# Patient Record
Sex: FEMALE | Race: BLACK OR AFRICAN AMERICAN | Employment: FULL TIME | URBAN - METROPOLITAN AREA
[De-identification: names, ages, dates, MRNs, and addresses within clinical notes are randomized per-mention and may not be internally consistent; named-entity substitution may affect disease eponyms.]

---

## 2017-03-23 ENCOUNTER — HOSPITAL ENCOUNTER (EMERGENCY)
Facility: HOSPITAL | Age: 52
Discharge: HOME | End: 2017-03-23
Attending: EMERGENCY MEDICINE
Payer: COMMERCIAL

## 2017-03-23 VITALS
HEART RATE: 90 BPM | WEIGHT: 216.06 LBS | SYSTOLIC BLOOD PRESSURE: 157 MMHG | OXYGEN SATURATION: 100 % | HEIGHT: 66 IN | BODY MASS INDEX: 34.72 KG/M2 | RESPIRATION RATE: 16 BRPM | DIASTOLIC BLOOD PRESSURE: 87 MMHG | TEMPERATURE: 98 F

## 2017-03-23 DIAGNOSIS — M79.602 LEFT ARM PAIN: Primary | ICD-10-CM

## 2017-03-23 LAB — TROPONIN: <0.046 NG/ML (ref ?–0.05)

## 2017-03-23 PROCEDURE — 36415 COLL VENOUS BLD VENIPUNCTURE: CPT

## 2017-03-23 PROCEDURE — 99284 EMERGENCY DEPT VISIT MOD MDM: CPT

## 2017-03-23 PROCEDURE — 93010 ELECTROCARDIOGRAM REPORT: CPT

## 2017-03-23 PROCEDURE — 93005 ELECTROCARDIOGRAM TRACING: CPT

## 2017-03-23 PROCEDURE — 84484 ASSAY OF TROPONIN QUANT: CPT | Performed by: EMERGENCY MEDICINE

## 2017-03-23 RX ORDER — PREDNISONE 20 MG/1
40 TABLET ORAL DAILY
Qty: 10 TABLET | Refills: 0 | Status: SHIPPED | OUTPATIENT
Start: 2017-03-23 | End: 2017-03-28

## 2017-03-24 LAB
ATRIAL RATE: 104 BPM
P AXIS: 60 DEGREES
P-R INTERVAL: 188 MS
Q-T INTERVAL: 362 MS
QRS DURATION: 88 MS
QTC CALCULATION (BEZET): 476 MS
R AXIS: -2 DEGREES
T AXIS: 40 DEGREES
VENTRICULAR RATE: 104 BPM

## 2017-03-24 NOTE — ED PROVIDER NOTES
Patient Seen in: [facility] Emergency Department    History   Patient presents with:  Chest Pain Angina (cardiovascular)    Stated Complaint: L ARM PAIN SENT FROM IMMEDIATE CARE    HPI    Patient is a 50-year-old female comes in emergency room for e needed. ibuprofen 600 MG Oral Tab,  Take 600 mg by mouth every 6 (six) hours as needed for Pain.    Accu-Chek Multiclix Lancets Does not apply Misc,  SMBG BID-TID   Glucose Blood (ACCU-CHEK MARTIN) In Vitro Strip,  SMBG BID-TID   Diclofenac Sodium (VOLTARE reviewed and negative except as noted above. PSFH elements reviewed from today and agreed except as otherwise stated in HPI.     Physical Exam       ED Triage Vitals   BP 03/23/17 2125 157/87 mmHg   Pulse 03/23/17 2125 90   Resp 03/23/17 2125 16   Temp 0 Report. Rate: 104  Rhythm: Sinus Rhythm  Reading: Sinus tachycardia. Poor R-wave progression which is similar to prior EKG.   No acute changes            MDM     53-year-old female comes in emergency room for evaluate for left arm pain there is no clinica

## 2017-05-15 PROBLEM — M54.2 NECK PAIN: Status: ACTIVE | Noted: 2017-05-15

## 2017-05-15 PROBLEM — M54.12 CERVICAL RADICULITIS: Status: ACTIVE | Noted: 2017-05-15

## 2018-03-01 PROCEDURE — 87480 CANDIDA DNA DIR PROBE: CPT | Performed by: FAMILY MEDICINE

## 2018-03-01 PROCEDURE — 82043 UR ALBUMIN QUANTITATIVE: CPT | Performed by: FAMILY MEDICINE

## 2018-03-01 PROCEDURE — 82570 ASSAY OF URINE CREATININE: CPT | Performed by: FAMILY MEDICINE

## 2018-03-01 PROCEDURE — 87660 TRICHOMONAS VAGIN DIR PROBE: CPT | Performed by: FAMILY MEDICINE

## 2018-03-01 PROCEDURE — 87510 GARDNER VAG DNA DIR PROBE: CPT | Performed by: FAMILY MEDICINE

## 2018-03-16 PROBLEM — M76.61 ACHILLES TENDINITIS, RIGHT LEG: Status: ACTIVE | Noted: 2018-03-16

## 2020-07-27 ENCOUNTER — APPOINTMENT (OUTPATIENT)
Dept: CT IMAGING | Facility: HOSPITAL | Age: 55
End: 2020-07-27
Attending: EMERGENCY MEDICINE
Payer: COMMERCIAL

## 2020-07-27 ENCOUNTER — HOSPITAL ENCOUNTER (EMERGENCY)
Facility: HOSPITAL | Age: 55
Discharge: HOME | End: 2020-07-27
Attending: EMERGENCY MEDICINE
Payer: COMMERCIAL

## 2020-07-27 VITALS
SYSTOLIC BLOOD PRESSURE: 155 MMHG | RESPIRATION RATE: 26 BRPM | TEMPERATURE: 98 F | WEIGHT: 240 LBS | HEIGHT: 66 IN | OXYGEN SATURATION: 97 % | BODY MASS INDEX: 38.57 KG/M2 | HEART RATE: 78 BPM | DIASTOLIC BLOOD PRESSURE: 71 MMHG

## 2020-07-27 DIAGNOSIS — I10 HYPERTENSION, UNSPECIFIED TYPE: Primary | ICD-10-CM

## 2020-07-27 LAB
ALBUMIN SERPL-MCNC: 3.6 G/DL (ref 3.4–5)
ALBUMIN/GLOB SERPL: 0.8 {RATIO} (ref 1–2)
ALP LIVER SERPL-CCNC: 91 U/L (ref 41–108)
ALT SERPL-CCNC: 28 U/L (ref 13–56)
ANION GAP SERPL CALC-SCNC: 3 MMOL/L (ref 0–18)
AST SERPL-CCNC: 22 U/L (ref 15–37)
ATRIAL RATE: 89 BPM
BASOPHILS # BLD AUTO: 0.03 X10(3) UL (ref 0–0.2)
BASOPHILS NFR BLD AUTO: 0.3 %
BILIRUB SERPL-MCNC: 0.4 MG/DL (ref 0.1–2)
BUN BLD-MCNC: 8 MG/DL (ref 7–18)
BUN/CREAT SERPL: 11.6 (ref 10–20)
CALCIUM BLD-MCNC: 9.3 MG/DL (ref 8.5–10.1)
CHLORIDE SERPL-SCNC: 105 MMOL/L (ref 98–112)
CO2 SERPL-SCNC: 29 MMOL/L (ref 21–32)
CREAT BLD-MCNC: 0.69 MG/DL (ref 0.55–1.02)
DEPRECATED RDW RBC AUTO: 42.5 FL (ref 35.1–46.3)
EOSINOPHIL # BLD AUTO: 0.1 X10(3) UL (ref 0–0.7)
EOSINOPHIL NFR BLD AUTO: 1.1 %
ERYTHROCYTE [DISTWIDTH] IN BLOOD BY AUTOMATED COUNT: 14.9 % (ref 11–15)
GLOBULIN PLAS-MCNC: 4.7 G/DL (ref 2.8–4.4)
GLUCOSE BLD-MCNC: 113 MG/DL (ref 70–99)
HCT VFR BLD AUTO: 39.6 % (ref 35–48)
HGB BLD-MCNC: 12.6 G/DL (ref 12–16)
IMM GRANULOCYTES # BLD AUTO: 0.04 X10(3) UL (ref 0–1)
IMM GRANULOCYTES NFR BLD: 0.4 %
LYMPHOCYTES # BLD AUTO: 2.52 X10(3) UL (ref 1–4)
LYMPHOCYTES NFR BLD AUTO: 27.7 %
M PROTEIN MFR SERPL ELPH: 8.3 G/DL (ref 6.4–8.2)
MCH RBC QN AUTO: 25.3 PG (ref 26–34)
MCHC RBC AUTO-ENTMCNC: 31.8 G/DL (ref 31–37)
MCV RBC AUTO: 79.5 FL (ref 80–100)
MONOCYTES # BLD AUTO: 0.76 X10(3) UL (ref 0.1–1)
MONOCYTES NFR BLD AUTO: 8.3 %
NEUTROPHILS # BLD AUTO: 5.66 X10 (3) UL (ref 1.5–7.7)
NEUTROPHILS # BLD AUTO: 5.66 X10(3) UL (ref 1.5–7.7)
NEUTROPHILS NFR BLD AUTO: 62.2 %
OSMOLALITY SERPL CALC.SUM OF ELEC: 283 MOSM/KG (ref 275–295)
P AXIS: 52 DEGREES
P-R INTERVAL: 188 MS
PLATELET # BLD AUTO: 268 10(3)UL (ref 150–450)
POTASSIUM SERPL-SCNC: 3.9 MMOL/L (ref 3.5–5.1)
Q-T INTERVAL: 396 MS
QRS DURATION: 82 MS
QTC CALCULATION (BEZET): 481 MS
R AXIS: 19 DEGREES
RBC # BLD AUTO: 4.98 X10(6)UL (ref 3.8–5.3)
SODIUM SERPL-SCNC: 137 MMOL/L (ref 136–145)
T AXIS: 60 DEGREES
VENTRICULAR RATE: 89 BPM
WBC # BLD AUTO: 9.1 X10(3) UL (ref 4–11)

## 2020-07-27 PROCEDURE — 70450 CT HEAD/BRAIN W/O DYE: CPT | Performed by: EMERGENCY MEDICINE

## 2020-07-27 PROCEDURE — 93005 ELECTROCARDIOGRAM TRACING: CPT

## 2020-07-27 PROCEDURE — 85025 COMPLETE CBC W/AUTO DIFF WBC: CPT | Performed by: EMERGENCY MEDICINE

## 2020-07-27 PROCEDURE — 36415 COLL VENOUS BLD VENIPUNCTURE: CPT

## 2020-07-27 PROCEDURE — 99285 EMERGENCY DEPT VISIT HI MDM: CPT

## 2020-07-27 PROCEDURE — 80053 COMPREHEN METABOLIC PANEL: CPT | Performed by: EMERGENCY MEDICINE

## 2020-07-27 PROCEDURE — 93010 ELECTROCARDIOGRAM REPORT: CPT

## 2020-07-27 RX ORDER — CLONIDINE HYDROCHLORIDE 0.2 MG/1
0.2 TABLET ORAL DAILY
Qty: 30 TABLET | Refills: 0 | Status: SHIPPED | OUTPATIENT
Start: 2020-07-27 | End: 2020-07-29 | Stop reason: DRUGHIGH

## 2020-07-27 RX ORDER — CLONIDINE HYDROCHLORIDE 0.1 MG/1
0.1 TABLET ORAL ONCE
Status: COMPLETED | OUTPATIENT
Start: 2020-07-27 | End: 2020-07-27

## 2020-07-27 NOTE — ED PROVIDER NOTES
Patient Seen in: [facility] Emergency Department      History   Patient presents with:  Hypertension  Headache    Stated Complaint: /137, +Headache    HPI    Patient is a pleasant 11-year-old female with a history of hypertension diabetes who complaint: /137, +Headache  Other systems are as noted in HPI. Constitutional and vital signs reviewed. All other systems reviewed and negative except as noted above.     Physical Exam     ED Triage Vitals [07/27/20 1554]   BP (!) 205/98   Puls Abnormality         Status                     ---------                               -----------         ------                     CBC W/ DIFFERENTIAL[[id number]]          Abnormal            Final result                 Ple systolic, better this morning but then again at the immediate care they are about there. On arrival was elevated although has entered the room and is 171/85.   She is comfortable with a normal neuro exam.  Labs, CT ordered and will observe her blood pressu

## 2020-07-27 NOTE — ED INITIAL ASSESSMENT (HPI)
Pt c/o HTN and +Headache; EMS rpt en route PT blood pressure 228/137.  PT was seen at [facility]

## 2020-08-18 ENCOUNTER — HOSPITAL ENCOUNTER (OUTPATIENT)
Facility: HOSPITAL | Age: 55
Setting detail: OBSERVATION
Discharge: HOME | End: 2020-08-19
Attending: EMERGENCY MEDICINE | Admitting: INTERNAL MEDICINE
Payer: COMMERCIAL

## 2020-08-18 DIAGNOSIS — I16.0 HYPERTENSIVE URGENCY: Primary | ICD-10-CM

## 2020-08-18 DIAGNOSIS — E11.65 TYPE 2 DIABETES MELLITUS WITH HYPERGLYCEMIA, WITHOUT LONG-TERM CURRENT USE OF INSULIN (HCC): ICD-10-CM

## 2020-08-18 LAB
ALBUMIN SERPL-MCNC: 4 G/DL (ref 3.4–5)
ALBUMIN/GLOB SERPL: 0.8 {RATIO} (ref 1–2)
ALP LIVER SERPL-CCNC: 101 U/L (ref 41–108)
ALT SERPL-CCNC: 25 U/L (ref 13–56)
ANION GAP SERPL CALC-SCNC: 5 MMOL/L (ref 0–18)
ANION GAP SERPL CALC-SCNC: 5 MMOL/L (ref 0–18)
AST SERPL-CCNC: 15 U/L (ref 15–37)
ATRIAL RATE: 111 BPM
BASOPHILS # BLD AUTO: 0.03 X10(3) UL (ref 0–0.2)
BASOPHILS # BLD AUTO: 0.04 X10(3) UL (ref 0–0.2)
BASOPHILS NFR BLD AUTO: 0.3 %
BASOPHILS NFR BLD AUTO: 0.3 %
BILIRUB SERPL-MCNC: 0.2 MG/DL (ref 0.1–2)
BILIRUB UR QL STRIP.AUTO: NEGATIVE
BUN BLD-MCNC: 11 MG/DL (ref 7–18)
BUN BLD-MCNC: 7 MG/DL (ref 7–18)
BUN/CREAT SERPL: 14.7 (ref 10–20)
BUN/CREAT SERPL: 8.8 (ref 10–20)
CALCIUM BLD-MCNC: 8.9 MG/DL (ref 8.5–10.1)
CALCIUM BLD-MCNC: 9.7 MG/DL (ref 8.5–10.1)
CHLORIDE SERPL-SCNC: 102 MMOL/L (ref 98–112)
CHLORIDE SERPL-SCNC: 105 MMOL/L (ref 98–112)
CLARITY UR REFRACT.AUTO: CLEAR
CO2 SERPL-SCNC: 28 MMOL/L (ref 21–32)
CO2 SERPL-SCNC: 30 MMOL/L (ref 21–32)
CREAT BLD-MCNC: 0.75 MG/DL (ref 0.55–1.02)
CREAT BLD-MCNC: 0.8 MG/DL (ref 0.55–1.02)
DEPRECATED RDW RBC AUTO: 43.1 FL (ref 35.1–46.3)
DEPRECATED RDW RBC AUTO: 44.7 FL (ref 35.1–46.3)
EOSINOPHIL # BLD AUTO: 0.06 X10(3) UL (ref 0–0.7)
EOSINOPHIL # BLD AUTO: 0.07 X10(3) UL (ref 0–0.7)
EOSINOPHIL NFR BLD AUTO: 0.6 %
EOSINOPHIL NFR BLD AUTO: 0.6 %
ERYTHROCYTE [DISTWIDTH] IN BLOOD BY AUTOMATED COUNT: 14.6 % (ref 11–15)
ERYTHROCYTE [DISTWIDTH] IN BLOOD BY AUTOMATED COUNT: 14.7 % (ref 11–15)
GLOBULIN PLAS-MCNC: 5.3 G/DL (ref 2.8–4.4)
GLUCOSE BLD-MCNC: 105 MG/DL (ref 70–99)
GLUCOSE BLD-MCNC: 106 MG/DL (ref 70–99)
GLUCOSE BLD-MCNC: 107 MG/DL (ref 70–99)
GLUCOSE BLD-MCNC: 113 MG/DL (ref 70–99)
GLUCOSE BLD-MCNC: 133 MG/DL (ref 70–99)
GLUCOSE BLD-MCNC: 145 MG/DL (ref 70–99)
GLUCOSE UR STRIP.AUTO-MCNC: NEGATIVE MG/DL
HAV IGM SER QL: 2 MG/DL (ref 1.6–2.6)
HCT VFR BLD AUTO: 37.5 % (ref 35–48)
HCT VFR BLD AUTO: 43.8 % (ref 35–48)
HGB BLD-MCNC: 11.5 G/DL (ref 12–16)
HGB BLD-MCNC: 14.1 G/DL (ref 12–16)
IMM GRANULOCYTES # BLD AUTO: 0.04 X10(3) UL (ref 0–1)
IMM GRANULOCYTES # BLD AUTO: 0.06 X10(3) UL (ref 0–1)
IMM GRANULOCYTES NFR BLD: 0.3 %
IMM GRANULOCYTES NFR BLD: 0.6 %
KETONES UR STRIP.AUTO-MCNC: NEGATIVE MG/DL
LEUKOCYTE ESTERASE UR QL STRIP.AUTO: NEGATIVE
LYMPHOCYTES # BLD AUTO: 3.01 X10(3) UL (ref 1–4)
LYMPHOCYTES # BLD AUTO: 3.77 X10(3) UL (ref 1–4)
LYMPHOCYTES NFR BLD AUTO: 28.1 %
LYMPHOCYTES NFR BLD AUTO: 32.9 %
M PROTEIN MFR SERPL ELPH: 9.3 G/DL (ref 6.4–8.2)
MCH RBC QN AUTO: 25.4 PG (ref 26–34)
MCH RBC QN AUTO: 26.1 PG (ref 26–34)
MCHC RBC AUTO-ENTMCNC: 30.7 G/DL (ref 31–37)
MCHC RBC AUTO-ENTMCNC: 32.2 G/DL (ref 31–37)
MCV RBC AUTO: 81 FL (ref 80–100)
MCV RBC AUTO: 83 FL (ref 80–100)
MONOCYTES # BLD AUTO: 0.67 X10(3) UL (ref 0.1–1)
MONOCYTES # BLD AUTO: 0.84 X10(3) UL (ref 0.1–1)
MONOCYTES NFR BLD AUTO: 6.3 %
MONOCYTES NFR BLD AUTO: 7.3 %
NEUTROPHILS # BLD AUTO: 6.69 X10 (3) UL (ref 1.5–7.7)
NEUTROPHILS # BLD AUTO: 6.69 X10(3) UL (ref 1.5–7.7)
NEUTROPHILS # BLD AUTO: 6.89 X10 (3) UL (ref 1.5–7.7)
NEUTROPHILS # BLD AUTO: 6.89 X10(3) UL (ref 1.5–7.7)
NEUTROPHILS NFR BLD AUTO: 58.6 %
NEUTROPHILS NFR BLD AUTO: 64.1 %
NITRITE UR QL STRIP.AUTO: NEGATIVE
OSMOLALITY SERPL CALC.SUM OF ELEC: 284 MOSM/KG (ref 275–295)
OSMOLALITY SERPL CALC.SUM OF ELEC: 286 MOSM/KG (ref 275–295)
P AXIS: 62 DEGREES
P-R INTERVAL: 188 MS
PH UR STRIP.AUTO: 8 [PH] (ref 4.5–8)
PLATELET # BLD AUTO: 314 10(3)UL (ref 150–450)
PLATELET # BLD AUTO: 380 10(3)UL (ref 150–450)
POTASSIUM SERPL-SCNC: 3.5 MMOL/L (ref 3.5–5.1)
POTASSIUM SERPL-SCNC: 3.6 MMOL/L (ref 3.5–5.1)
PROT UR STRIP.AUTO-MCNC: 30 MG/DL
Q-T INTERVAL: 336 MS
QRS DURATION: 82 MS
QTC CALCULATION (BEZET): 456 MS
R AXIS: 41 DEGREES
RBC # BLD AUTO: 4.52 X10(6)UL (ref 3.8–5.3)
RBC # BLD AUTO: 5.41 X10(6)UL (ref 3.8–5.3)
RBC UR QL AUTO: NEGATIVE
SARS-COV-2 RNA RESP QL NAA+PROBE: NOT DETECTED
SODIUM SERPL-SCNC: 137 MMOL/L (ref 136–145)
SODIUM SERPL-SCNC: 138 MMOL/L (ref 136–145)
SP GR UR STRIP.AUTO: 1 (ref 1–1.03)
T AXIS: 51 DEGREES
UROBILINOGEN UR STRIP.AUTO-MCNC: <2 MG/DL
VENTRICULAR RATE: 111 BPM
WBC # BLD AUTO: 10.7 X10(3) UL (ref 4–11)
WBC # BLD AUTO: 11.5 X10(3) UL (ref 4–11)

## 2020-08-18 PROCEDURE — 99285 EMERGENCY DEPT VISIT HI MDM: CPT

## 2020-08-18 PROCEDURE — 96376 TX/PRO/DX INJ SAME DRUG ADON: CPT

## 2020-08-18 PROCEDURE — 82962 GLUCOSE BLOOD TEST: CPT

## 2020-08-18 PROCEDURE — 85025 COMPLETE CBC W/AUTO DIFF WBC: CPT | Performed by: EMERGENCY MEDICINE

## 2020-08-18 PROCEDURE — 81001 URINALYSIS AUTO W/SCOPE: CPT | Performed by: EMERGENCY MEDICINE

## 2020-08-18 PROCEDURE — 83735 ASSAY OF MAGNESIUM: CPT | Performed by: INTERNAL MEDICINE

## 2020-08-18 PROCEDURE — 93005 ELECTROCARDIOGRAM TRACING: CPT

## 2020-08-18 PROCEDURE — 80053 COMPREHEN METABOLIC PANEL: CPT | Performed by: EMERGENCY MEDICINE

## 2020-08-18 PROCEDURE — 96374 THER/PROPH/DIAG INJ IV PUSH: CPT

## 2020-08-18 PROCEDURE — 93010 ELECTROCARDIOGRAM REPORT: CPT

## 2020-08-18 PROCEDURE — 96372 THER/PROPH/DIAG INJ SC/IM: CPT

## 2020-08-18 PROCEDURE — 85025 COMPLETE CBC W/AUTO DIFF WBC: CPT | Performed by: INTERNAL MEDICINE

## 2020-08-18 RX ORDER — VALSARTAN 320 MG/1
320 TABLET ORAL DAILY
Status: DISCONTINUED | OUTPATIENT
Start: 2020-08-18 | End: 2020-08-20

## 2020-08-18 RX ORDER — ONDANSETRON 2 MG/ML
4 INJECTION INTRAMUSCULAR; INTRAVENOUS EVERY 6 HOURS PRN
Status: DISCONTINUED | OUTPATIENT
Start: 2020-08-18 | End: 2020-08-20

## 2020-08-18 RX ORDER — IBUPROFEN 200 MG
200 TABLET ORAL EVERY 4 HOURS PRN
Status: DISCONTINUED | OUTPATIENT
Start: 2020-08-18 | End: 2020-08-20

## 2020-08-18 RX ORDER — LABETALOL HYDROCHLORIDE 5 MG/ML
10 INJECTION, SOLUTION INTRAVENOUS EVERY 10 MIN PRN
Status: DISCONTINUED | OUTPATIENT
Start: 2020-08-18 | End: 2020-08-20

## 2020-08-18 RX ORDER — DEXTROSE MONOHYDRATE 25 G/50ML
50 INJECTION, SOLUTION INTRAVENOUS
Status: DISCONTINUED | OUTPATIENT
Start: 2020-08-18 | End: 2020-08-20

## 2020-08-18 RX ORDER — ENOXAPARIN SODIUM 100 MG/ML
40 INJECTION SUBCUTANEOUS DAILY
Status: DISCONTINUED | OUTPATIENT
Start: 2020-08-18 | End: 2020-08-20

## 2020-08-18 RX ORDER — POTASSIUM CHLORIDE 20 MEQ/1
40 TABLET, EXTENDED RELEASE ORAL EVERY 4 HOURS
Status: COMPLETED | OUTPATIENT
Start: 2020-08-18 | End: 2020-08-18

## 2020-08-18 RX ORDER — LABETALOL 100 MG/1
300 TABLET, FILM COATED ORAL EVERY 12 HOURS SCHEDULED
Status: DISCONTINUED | OUTPATIENT
Start: 2020-08-18 | End: 2020-08-20

## 2020-08-18 RX ORDER — HYDRALAZINE HYDROCHLORIDE 20 MG/ML
10 INJECTION INTRAMUSCULAR; INTRAVENOUS EVERY 6 HOURS PRN
Status: DISCONTINUED | OUTPATIENT
Start: 2020-08-18 | End: 2020-08-20

## 2020-08-18 RX ORDER — POLYETHYLENE GLYCOL 3350 17 G/17G
17 POWDER, FOR SOLUTION ORAL DAILY PRN
Status: DISCONTINUED | OUTPATIENT
Start: 2020-08-18 | End: 2020-08-20

## 2020-08-18 RX ORDER — IBUPROFEN 600 MG/1
600 TABLET ORAL EVERY 4 HOURS PRN
Status: DISCONTINUED | OUTPATIENT
Start: 2020-08-18 | End: 2020-08-20

## 2020-08-18 RX ORDER — CHLORTHALIDONE 25 MG/1
25 TABLET ORAL DAILY
Status: DISCONTINUED | OUTPATIENT
Start: 2020-08-18 | End: 2020-08-20

## 2020-08-18 RX ORDER — METOCLOPRAMIDE HYDROCHLORIDE 5 MG/ML
10 INJECTION INTRAMUSCULAR; INTRAVENOUS EVERY 8 HOURS PRN
Status: DISCONTINUED | OUTPATIENT
Start: 2020-08-18 | End: 2020-08-20

## 2020-08-18 RX ORDER — ACETAMINOPHEN 325 MG/1
650 TABLET ORAL EVERY 6 HOURS PRN
Status: DISCONTINUED | OUTPATIENT
Start: 2020-08-18 | End: 2020-08-20

## 2020-08-18 RX ORDER — HYDRALAZINE HYDROCHLORIDE 50 MG/1
50 TABLET, FILM COATED ORAL 3 TIMES DAILY PRN
Status: DISCONTINUED | OUTPATIENT
Start: 2020-08-18 | End: 2020-08-18

## 2020-08-18 RX ORDER — DOCUSATE SODIUM 100 MG/1
100 CAPSULE, LIQUID FILLED ORAL 2 TIMES DAILY
Status: DISCONTINUED | OUTPATIENT
Start: 2020-08-18 | End: 2020-08-20

## 2020-08-18 RX ORDER — ONDANSETRON 2 MG/ML
4 INJECTION INTRAMUSCULAR; INTRAVENOUS EVERY 4 HOURS PRN
Status: DISCONTINUED | OUTPATIENT
Start: 2020-08-18 | End: 2020-08-18

## 2020-08-18 RX ORDER — SODIUM PHOSPHATE, DIBASIC AND SODIUM PHOSPHATE, MONOBASIC 7; 19 G/133ML; G/133ML
1 ENEMA RECTAL ONCE AS NEEDED
Status: DISCONTINUED | OUTPATIENT
Start: 2020-08-18 | End: 2020-08-20

## 2020-08-18 RX ORDER — BISACODYL 10 MG
10 SUPPOSITORY, RECTAL RECTAL
Status: DISCONTINUED | OUTPATIENT
Start: 2020-08-18 | End: 2020-08-20

## 2020-08-18 RX ORDER — IBUPROFEN 400 MG/1
400 TABLET ORAL EVERY 4 HOURS PRN
Status: DISCONTINUED | OUTPATIENT
Start: 2020-08-18 | End: 2020-08-20

## 2020-08-18 RX ORDER — CLONIDINE HYDROCHLORIDE 0.2 MG/1
0.2 TABLET ORAL 2 TIMES DAILY
Status: DISCONTINUED | OUTPATIENT
Start: 2020-08-18 | End: 2020-08-20

## 2020-08-18 RX ORDER — LABETALOL HYDROCHLORIDE 5 MG/ML
10 INJECTION, SOLUTION INTRAVENOUS ONCE
Status: COMPLETED | OUTPATIENT
Start: 2020-08-18 | End: 2020-08-18

## 2020-08-18 NOTE — ED PROVIDER NOTES
Patient Seen in: [facility] Emergency Department      History   Patient presents with:  Hypertension    Stated Complaint: HTN    HPI    This is a pleasant 58-year-old -American female with a history of hypertension, history diabetes, who pres pertinent past medical history. No pertinent past surgical history. No pertinent social history. Review of Systems    Positive for stated complaint: HTN  Other systems are as noted in HPI.   Constitutional and vital si normal limits   URINALYSIS WITH CULTURE REFLEX - Abnormal; Notable for the following components:    Protein Urine 30  (*)     Bacteria Urine Rare (*)     All other components within normal limits   CBC W/ DIFFERENTIAL - Abnormal; Notable for the following persistent hypertensive state which is recalcitrant to multiple hypertensive medications, patient will be admitted for observation, administration of IV antihypertensive medications and repeat assessment in the morning to consider adjustments to her medica

## 2020-08-18 NOTE — CONSULTS
[facility] Cardiology  Report of Consultation    Liseth Moore Patient Status:  Observation    1965 MRN [id number]   [facility] 3NE-A Attending Vincenzo Xiao, [address] [facility] Day # 0 PCP Yusef Vinson MD     [facility] for [facility] ([facility] Infusion:       PRN Medications:   ondansetron HCl, ondansetron HCl, Labetalol HCl, acetaminophen, ibuprofen **OR** ibuprofen **OR** ibuprofen, ondansetron HCl, Metoclopramide HCl, PEG 3350, magnesium hydroxide, bisacodyl, Fleet Enema, hydrALAzine HCl, glu Glucose Monitoring Suppl (ACCU-CHEK MARTIN) Does not apply Kit, SMBG BID-TID, Disp: 1 Kit, Rfl: 0  Blood Glucose Calibration (ACCU-CHEK MARTIN) In Vitro Solution, Test with each new set of strips, Disp: 1 Each, Rfl: 3        Allergies:   No Known Allergies BILT 0.2  --    TP 9.3*  --        Recent Labs   Lab 08/18/20  0102 08/18/20  0800   RBC 5.41* 4.52   HGB 14.1 11.5*   HCT 43.8 37.5   MCV 81.0 83.0   MCH 26.1 25.4*   MCHC 32.2 30.7*   RDW 14.6 14.7   NEPRELIM 6.89 6.69   WBC 10.7 11.5*   .0 314.

## 2020-08-18 NOTE — ED NOTES
Reuben King at bedside. 1-2 days: Independent in transfers bed to chair and vice versa, toilet transfers and maneuvers using rolling walker device, observing proper body  mechanics, positioning and precautions.

## 2020-08-18 NOTE — ED NOTES
Assumed care,report received from hospitals. Pt resting in bed, she denies CP, denies DENTON. Reports HA, 4/10. Pt updated of the POC.  Repeat Labetalol IV given as ordered

## 2020-08-18 NOTE — PLAN OF CARE
Assumed care at [phone]. Pt admitted for hypertension. A&O x4. On Ra, . Daily weight. NSR on tele. VSS. Denies pain. Voids. Electrolyte protocol. Up ad anjel. Refuses SCDs. Accuchecks QID. Carb controlled diet, 3-4 GM sodium diet. Will continue to monitor.

## 2020-08-18 NOTE — ED INITIAL ASSESSMENT (HPI)
Patient reports a headache all day and dizziness. She took her blood pressure pills tonight, and a at-home blood pressure reading that was elevated. A&Ox4, denies any N/V/D. Patient also feels as if she has to urinate every 5 minutes.

## 2020-08-18 NOTE — H&P
[facility] Hospitalist Team  History and Physical       Assessment/Plan:     #Resistant HTN  -has been on various medications without good control  -cont current regimen as currently controlled  -low sodium diet  -ordered sleep study on DC  -check renal US  -card Allergies     No current outpatient medications on file.       Social History    Tobacco Use      Smoking status: Never Smoker      Smokeless tobacco: Never Used    Alcohol use: No      Alcohol/week: 0.0 standard drinks       Fam Hx  Family History   Proble (-) weakness (-) numbness  PSYCHIATRIC:  (-) disorder of thought or mood  ENDOCRINE:  (-) heat or cold intolerance (-) polyuria (-) polydipsia  HEMATOLOGICAL:  (-) easy bruising (-) bleeding    OBJECTIVE:  /71 (BP Location: Left arm)   Pulse 68   Tem Noncontrast CT scanning is performed through the brain. Dose reduction techniques were used. Dose information is transmitted to the [facility] [employer] [facility] Radiology) NRDR ([address]) which includes the Dose Index Registry.   PATIENT

## 2020-08-19 ENCOUNTER — APPOINTMENT (OUTPATIENT)
Dept: CV DIAGNOSTICS | Facility: HOSPITAL | Age: 55
End: 2020-08-19
Attending: INTERNAL MEDICINE
Payer: COMMERCIAL

## 2020-08-19 ENCOUNTER — APPOINTMENT (OUTPATIENT)
Dept: CT IMAGING | Facility: HOSPITAL | Age: 55
End: 2020-08-19
Attending: INTERNAL MEDICINE
Payer: COMMERCIAL

## 2020-08-19 ENCOUNTER — APPOINTMENT (OUTPATIENT)
Dept: ULTRASOUND IMAGING | Facility: HOSPITAL | Age: 55
End: 2020-08-19
Attending: INTERNAL MEDICINE
Payer: COMMERCIAL

## 2020-08-19 VITALS
RESPIRATION RATE: 16 BRPM | WEIGHT: 239.69 LBS | HEART RATE: 77 BPM | HEIGHT: 66 IN | OXYGEN SATURATION: 100 % | BODY MASS INDEX: 38.52 KG/M2 | TEMPERATURE: 98 F | SYSTOLIC BLOOD PRESSURE: 129 MMHG | DIASTOLIC BLOOD PRESSURE: 64 MMHG

## 2020-08-19 LAB
ANION GAP SERPL CALC-SCNC: 2 MMOL/L (ref 0–18)
BUN BLD-MCNC: 12 MG/DL (ref 7–18)
BUN/CREAT SERPL: 16.2 (ref 10–20)
CALCIUM BLD-MCNC: 9.2 MG/DL (ref 8.5–10.1)
CHLORIDE SERPL-SCNC: 105 MMOL/L (ref 98–112)
CO2 SERPL-SCNC: 30 MMOL/L (ref 21–32)
CREAT BLD-MCNC: 0.74 MG/DL (ref 0.55–1.02)
GLUCOSE BLD-MCNC: 101 MG/DL (ref 70–99)
GLUCOSE BLD-MCNC: 117 MG/DL (ref 70–99)
GLUCOSE BLD-MCNC: 121 MG/DL (ref 70–99)
OSMOLALITY SERPL CALC.SUM OF ELEC: 285 MOSM/KG (ref 275–295)
POTASSIUM SERPL-SCNC: 4 MMOL/L (ref 3.5–5.1)
SODIUM SERPL-SCNC: 137 MMOL/L (ref 136–145)

## 2020-08-19 PROCEDURE — 82088 ASSAY OF ALDOSTERONE: CPT | Performed by: INTERNAL MEDICINE

## 2020-08-19 PROCEDURE — 93975 VASCULAR STUDY: CPT | Performed by: INTERNAL MEDICINE

## 2020-08-19 PROCEDURE — 93306 TTE W/DOPPLER COMPLETE: CPT | Performed by: INTERNAL MEDICINE

## 2020-08-19 PROCEDURE — 76775 US EXAM ABDO BACK WALL LIM: CPT | Performed by: INTERNAL MEDICINE

## 2020-08-19 PROCEDURE — B246ZZZ ULTRASONOGRAPHY OF RIGHT AND LEFT HEART: ICD-10-PCS | Performed by: INTERNAL MEDICINE

## 2020-08-19 PROCEDURE — 96372 THER/PROPH/DIAG INJ SC/IM: CPT

## 2020-08-19 PROCEDURE — 80048 BASIC METABOLIC PNL TOTAL CA: CPT | Performed by: INTERNAL MEDICINE

## 2020-08-19 PROCEDURE — 74175 CTA ABDOMEN W/CONTRAST: CPT | Performed by: INTERNAL MEDICINE

## 2020-08-19 PROCEDURE — 82962 GLUCOSE BLOOD TEST: CPT

## 2020-08-19 PROCEDURE — 84244 ASSAY OF RENIN: CPT | Performed by: INTERNAL MEDICINE

## 2020-08-19 PROCEDURE — 76770 US EXAM ABDO BACK WALL COMP: CPT | Performed by: INTERNAL MEDICINE

## 2020-08-19 RX ORDER — CLONIDINE HYDROCHLORIDE 0.2 MG/1
0.2 TABLET ORAL 2 TIMES DAILY
Qty: 60 TABLET | Refills: 3 | Status: SHIPPED | OUTPATIENT
Start: 2020-08-19 | End: 2021-01-13 | Stop reason: DRUGHIGH

## 2020-08-19 RX ORDER — CHLORTHALIDONE 25 MG/1
25 TABLET ORAL DAILY
Qty: 30 TABLET | Refills: 3 | Status: SHIPPED | OUTPATIENT
Start: 2020-08-20 | End: 2021-01-21

## 2020-08-19 RX ORDER — VALSARTAN 320 MG/1
320 TABLET ORAL DAILY
Qty: 90 TABLET | Refills: 3 | Status: SHIPPED | OUTPATIENT
Start: 2020-08-20 | End: 2021-11-30

## 2020-08-19 RX ORDER — LABETALOL 300 MG/1
300 TABLET, FILM COATED ORAL EVERY 12 HOURS SCHEDULED
Qty: 60 TABLET | Refills: 3 | Status: SHIPPED | OUTPATIENT
Start: 2020-08-19 | End: 2021-11-30

## 2020-08-19 NOTE — PROGRESS NOTES
Received pt at 2300. Pt A&Ox4. o2 sats WNL on room air. On tele, NSR. NPO will be getting an ECHO and U/S kidney and bladder on 8/19. Voids to the bathroom by self. No c/o pain for me at the moment. VSS.  WCTM

## 2020-08-19 NOTE — PLAN OF CARE
Assumed care at [address] [facility]. Pt is A&O x4. On RA, . Nsr on tele Systolic BP ranging b/q 958 - 150. Receives Lovenox. Refuses SCDs. Electrolyte protocol. Denies pain. Up ad anjel. Accucheck QID. Carb controlled diet, 3-4 GM sodium diet.    Card

## 2020-08-19 NOTE — PROGRESS NOTES
[facility] Hospitalist Team  Progress Note      Tre Chung  9/29/1965    Assessment/Plan:     #Resistant HTN  -has been on various medications without good control  -cont current regimen as currently controlled  -low sodium diet  -ordered sleep study on DC  - 0.2 mg Oral BID   • valsartan  320 mg Oral Daily   • Labetalol HCl  300 mg Oral 2 times per day   • enoxaparin  40 mg Subcutaneous Daily   • docusate sodium  100 mg Oral BID   • Insulin Aspart Pen  4-20 Units Subcutaneous TID CC and HS   • chlorthalidone

## 2020-08-19 NOTE — PLAN OF CARE
Renal artery CT scan without evidence of renal artery stenosis. BP improved. Stable CV status for discharge home. Follow up 1 month. Medications reconciled.      EZEQUIEL Willis

## 2020-08-19 NOTE — PROGRESS NOTES
Called ultrasound to clarify if pt could have sips with meds while NPO per ultrasound ok to give med with small sip of water.

## 2020-08-19 NOTE — PROGRESS NOTES
[facility] Cardiology  Progress Note    Josué Goncalves Patient Status:  Observation    1965 MRN [id number]   [facility] 3NE-A Attending Aidee Urrutia, DO   Hosp Day # 0 PCP Stefanie Krabbe, MD     Subjective:   No chest pain. is no jugular venous distention. Cardiovascular:  Cardiovascular examination demonstrates a regular rate and rhythm. There is normal S1, S2. There is no S3 or S4. There are no murmurs, rubs, or gallops. No click is appreciated.   PMI is nondisplaced w MD  8/19/2020  9:19 AM

## 2020-08-19 NOTE — PROGRESS NOTES
Patient alertx4, ra, tele, up ad anjel. No c/o pain or discomfort. BP management per MAR, echo, US kidney/bladder complete. Waiting on CT angiography of the renal artery. Possible d/c home pending procedure results and BP stabilization.   Will continue to

## 2020-08-22 LAB
ALDOSTERONE/RENIN ACTIVITY RATIO: 15.1 RATIO
ALDOSTERONE: 9.1 NG/DL
RENIN ACTIVITY: 0.6 NG/ML/HR

## 2020-08-26 ENCOUNTER — HOSPITAL ENCOUNTER (EMERGENCY)
Facility: HOSPITAL | Age: 55
Discharge: HOME | End: 2020-08-26
Attending: EMERGENCY MEDICINE
Payer: COMMERCIAL

## 2020-08-26 VITALS
OXYGEN SATURATION: 98 % | BODY MASS INDEX: 38 KG/M2 | DIASTOLIC BLOOD PRESSURE: 82 MMHG | TEMPERATURE: 98 F | WEIGHT: 235 LBS | SYSTOLIC BLOOD PRESSURE: 142 MMHG | RESPIRATION RATE: 16 BRPM | HEART RATE: 86 BPM

## 2020-08-26 DIAGNOSIS — I10 ESSENTIAL HYPERTENSION: Primary | ICD-10-CM

## 2020-08-26 PROCEDURE — 99283 EMERGENCY DEPT VISIT LOW MDM: CPT

## 2020-08-26 RX ORDER — IBUPROFEN 600 MG/1
600 TABLET ORAL ONCE
Status: COMPLETED | OUTPATIENT
Start: 2020-08-26 | End: 2020-08-26

## 2020-08-26 RX ORDER — HYDRALAZINE HYDROCHLORIDE 25 MG/1
25 TABLET, FILM COATED ORAL EVERY 8 HOURS SCHEDULED
Status: DISCONTINUED | OUTPATIENT
Start: 2020-08-26 | End: 2020-08-26

## 2020-08-26 RX ORDER — HYDRALAZINE HYDROCHLORIDE 25 MG/1
25 TABLET, FILM COATED ORAL 3 TIMES DAILY
Qty: 30 TABLET | Refills: 0 | Status: SHIPPED | OUTPATIENT
Start: 2020-08-26 | End: 2020-09-16

## 2020-08-26 NOTE — ED PROVIDER NOTES
Patient Seen in: [facility] Emergency Department      History   Patient presents with:  Numbness Weakness    Stated Complaint: tingling to left arm sent from     HPI    50-year-old female presents emergency department who is not feeling right afte exam.  Stethoscope and any equipment used during my examination was cleaned with super sani-cloth germicidal wipes following the exam.         Vital signs reviewed  General appearance: Patient is alert and in no acute distress  HEENT: Pupils equal react to prescribed. Patient is aware that they are to return to ED if any worsening problems. Patient was also instructed to followup with the appropriate physician. Patient verbalizes and agrees with plan. Patient discharged in good condition.

## 2020-08-26 NOTE — ED INITIAL ASSESSMENT (HPI)
Per patient, not feeling right and having HTN. Was admitted last week for HTN. AAox 4. Patient feels a \"fullness\" in her head and took tylenol for this about 1.5 hours ago.

## 2020-09-15 PROBLEM — E05.00 GRAVES DISEASE: Status: ACTIVE | Noted: 2020-09-15

## 2021-05-26 PROBLEM — U07.1 COVID-19 VIRUS INFECTION: Status: ACTIVE | Noted: 2021-05-22

## 2023-09-27 ENCOUNTER — APPOINTMENT (OUTPATIENT)
Dept: CT IMAGING | Age: 58
End: 2023-09-27
Attending: EMERGENCY MEDICINE
Payer: COMMERCIAL

## 2023-09-27 ENCOUNTER — HOSPITAL ENCOUNTER (EMERGENCY)
Age: 58
Discharge: HOME | End: 2023-09-27
Attending: EMERGENCY MEDICINE
Payer: COMMERCIAL

## 2023-09-27 VITALS
WEIGHT: 240 LBS | BODY MASS INDEX: 38.57 KG/M2 | DIASTOLIC BLOOD PRESSURE: 76 MMHG | TEMPERATURE: 98 F | SYSTOLIC BLOOD PRESSURE: 153 MMHG | RESPIRATION RATE: 17 BRPM | HEART RATE: 89 BPM | OXYGEN SATURATION: 99 % | HEIGHT: 66 IN

## 2023-09-27 DIAGNOSIS — N13.2 HYDRONEPHROSIS WITH URINARY OBSTRUCTION DUE TO URETERAL CALCULUS: ICD-10-CM

## 2023-09-27 DIAGNOSIS — N20.1 RIGHT URETERAL STONE: Primary | ICD-10-CM

## 2023-09-27 DIAGNOSIS — R09.89 LABILE HYPERTENSION: ICD-10-CM

## 2023-09-27 LAB
ALBUMIN SERPL-MCNC: 3.7 G/DL (ref 3.4–5)
ALBUMIN/GLOB SERPL: 0.7 {RATIO} (ref 1–2)
ALP LIVER SERPL-CCNC: 91 U/L
ALT SERPL-CCNC: 31 U/L
ANION GAP SERPL CALC-SCNC: 7 MMOL/L (ref 0–18)
AST SERPL-CCNC: 18 U/L (ref 15–37)
ATRIAL RATE: 131 BPM
BASOPHILS # BLD AUTO: 0.06 X10(3) UL (ref 0–0.2)
BASOPHILS NFR BLD AUTO: 0.4 %
BILIRUB SERPL-MCNC: 0.3 MG/DL (ref 0.1–2)
BILIRUB UR QL STRIP.AUTO: NEGATIVE
BUN BLD-MCNC: 10 MG/DL (ref 7–18)
CALCIUM BLD-MCNC: 9.9 MG/DL (ref 8.5–10.1)
CHLORIDE SERPL-SCNC: 100 MMOL/L (ref 98–112)
CLARITY UR REFRACT.AUTO: CLEAR
CO2 SERPL-SCNC: 31 MMOL/L (ref 21–32)
COLOR UR AUTO: YELLOW
CREAT BLD-MCNC: 0.84 MG/DL
D DIMER PPP FEU-MCNC: 1.03 UG/ML FEU (ref ?–0.58)
EGFRCR SERPLBLD CKD-EPI 2021: 80 ML/MIN/1.73M2 (ref 60–?)
EOSINOPHIL # BLD AUTO: 0.09 X10(3) UL (ref 0–0.7)
EOSINOPHIL NFR BLD AUTO: 0.6 %
ERYTHROCYTE [DISTWIDTH] IN BLOOD BY AUTOMATED COUNT: 15.4 %
GLOBULIN PLAS-MCNC: 5.2 G/DL (ref 2.8–4.4)
GLUCOSE BLD-MCNC: 164 MG/DL (ref 70–99)
GLUCOSE BLD-MCNC: 168 MG/DL (ref 70–99)
GLUCOSE UR STRIP.AUTO-MCNC: NEGATIVE MG/DL
HCT VFR BLD AUTO: 40.2 %
HGB BLD-MCNC: 12.9 G/DL
IMM GRANULOCYTES # BLD AUTO: 0.09 X10(3) UL (ref 0–1)
IMM GRANULOCYTES NFR BLD: 0.6 %
KETONES UR STRIP.AUTO-MCNC: NEGATIVE MG/DL
LEUKOCYTE ESTERASE UR QL STRIP.AUTO: NEGATIVE
LYMPHOCYTES # BLD AUTO: 3.64 X10(3) UL (ref 1–4)
LYMPHOCYTES NFR BLD AUTO: 26.1 %
MCH RBC QN AUTO: 25 PG (ref 26–34)
MCHC RBC AUTO-ENTMCNC: 32.1 G/DL (ref 31–37)
MCV RBC AUTO: 77.8 FL
MONOCYTES # BLD AUTO: 0.96 X10(3) UL (ref 0.1–1)
MONOCYTES NFR BLD AUTO: 6.9 %
NEUTROPHILS # BLD AUTO: 9.1 X10 (3) UL (ref 1.5–7.7)
NEUTROPHILS # BLD AUTO: 9.1 X10(3) UL (ref 1.5–7.7)
NEUTROPHILS NFR BLD AUTO: 65.4 %
NITRITE UR QL STRIP.AUTO: NEGATIVE
OSMOLALITY SERPL CALC.SUM OF ELEC: 289 MOSM/KG (ref 275–295)
P-R INTERVAL: 112 MS
PH UR STRIP.AUTO: 7 [PH] (ref 5–8)
PLATELET # BLD AUTO: 399 10(3)UL (ref 150–450)
POTASSIUM SERPL-SCNC: 3.3 MMOL/L (ref 3.5–5.1)
PROT SERPL-MCNC: 8.9 G/DL (ref 6.4–8.2)
Q-T INTERVAL: 384 MS
QRS DURATION: 86 MS
QTC CALCULATION (BEZET): 567 MS
R AXIS: 27 DEGREES
RBC # BLD AUTO: 5.17 X10(6)UL
RBC UR QL AUTO: NEGATIVE
SARS-COV-2 RNA RESP QL NAA+PROBE: NOT DETECTED
SODIUM SERPL-SCNC: 138 MMOL/L (ref 136–145)
SP GR UR STRIP.AUTO: 1.02 (ref 1–1.03)
T AXIS: 56 DEGREES
TROPONIN I HIGH SENSITIVITY: 20 NG/L
TSI SER-ACNC: 2.5 MIU/ML (ref 0.36–3.74)
UROBILINOGEN UR STRIP.AUTO-MCNC: 0.2 MG/DL
VENTRICULAR RATE: 131 BPM
WBC # BLD AUTO: 13.9 X10(3) UL (ref 4–11)

## 2023-09-27 PROCEDURE — 71275 CT ANGIOGRAPHY CHEST: CPT | Performed by: EMERGENCY MEDICINE

## 2023-09-27 PROCEDURE — 85025 COMPLETE CBC W/AUTO DIFF WBC: CPT | Performed by: EMERGENCY MEDICINE

## 2023-09-27 PROCEDURE — 93010 ELECTROCARDIOGRAM REPORT: CPT

## 2023-09-27 PROCEDURE — 74176 CT ABD & PELVIS W/O CONTRAST: CPT | Performed by: EMERGENCY MEDICINE

## 2023-09-27 PROCEDURE — 84484 ASSAY OF TROPONIN QUANT: CPT | Performed by: EMERGENCY MEDICINE

## 2023-09-27 PROCEDURE — 99285 EMERGENCY DEPT VISIT HI MDM: CPT

## 2023-09-27 PROCEDURE — 81015 MICROSCOPIC EXAM OF URINE: CPT | Performed by: EMERGENCY MEDICINE

## 2023-09-27 PROCEDURE — 96360 HYDRATION IV INFUSION INIT: CPT

## 2023-09-27 PROCEDURE — 82962 GLUCOSE BLOOD TEST: CPT

## 2023-09-27 PROCEDURE — 85379 FIBRIN DEGRADATION QUANT: CPT | Performed by: EMERGENCY MEDICINE

## 2023-09-27 PROCEDURE — 84443 ASSAY THYROID STIM HORMONE: CPT | Performed by: EMERGENCY MEDICINE

## 2023-09-27 PROCEDURE — 93005 ELECTROCARDIOGRAM TRACING: CPT

## 2023-09-27 PROCEDURE — 81001 URINALYSIS AUTO W/SCOPE: CPT | Performed by: EMERGENCY MEDICINE

## 2023-09-27 PROCEDURE — 80053 COMPREHEN METABOLIC PANEL: CPT | Performed by: EMERGENCY MEDICINE

## 2023-09-27 RX ORDER — HYDROCHLOROTHIAZIDE 25 MG/1
100 TABLET ORAL 2 TIMES DAILY
COMMUNITY

## 2023-09-27 RX ORDER — CLONIDINE HYDROCHLORIDE 0.1 MG/1
0.1 TABLET ORAL ONCE
Status: COMPLETED | OUTPATIENT
Start: 2023-09-27 | End: 2023-09-27

## 2023-09-27 RX ORDER — POTASSIUM CHLORIDE 20 MEQ/1
40 TABLET, EXTENDED RELEASE ORAL ONCE
Status: COMPLETED | OUTPATIENT
Start: 2023-09-27 | End: 2023-09-27

## 2023-09-27 NOTE — ED INITIAL ASSESSMENT (HPI)
Pt states she started having a stiff neck on Saturday still having a headache no injury.  Pt also feeling palpitations in her heart no chest pain

## 2023-09-28 NOTE — DISCHARGE INSTRUCTIONS
Please take a dose of your clonidine in response to high blood pressures, he needs make an appointment to follow-up with Dr. Anabell Michelle have a large kidney stone that has fallen into your ureter and is obstructing the right kidney and may be intermittently affecting your blood pressure    Ever, this could also be the occasional skipping of the clonidine, you cannot skip that medication, speak with your primary care doctor about it because we may need to talk about doing a patch of clonidine if you continue to need blood pressure.

## 2023-10-11 ENCOUNTER — APPOINTMENT (OUTPATIENT)
Dept: MRI IMAGING | Age: 58
End: 2023-10-11
Attending: EMERGENCY MEDICINE
Payer: COMMERCIAL

## 2023-10-11 ENCOUNTER — APPOINTMENT (OUTPATIENT)
Dept: GENERAL RADIOLOGY | Age: 58
End: 2023-10-11
Attending: EMERGENCY MEDICINE
Payer: COMMERCIAL

## 2023-10-11 ENCOUNTER — HOSPITAL ENCOUNTER (EMERGENCY)
Age: 58
Discharge: HOME | End: 2023-10-11
Attending: EMERGENCY MEDICINE
Payer: COMMERCIAL

## 2023-10-11 VITALS
SYSTOLIC BLOOD PRESSURE: 174 MMHG | TEMPERATURE: 98 F | DIASTOLIC BLOOD PRESSURE: 103 MMHG | WEIGHT: 240 LBS | HEART RATE: 92 BPM | HEIGHT: 66 IN | BODY MASS INDEX: 38.57 KG/M2 | OXYGEN SATURATION: 98 % | RESPIRATION RATE: 18 BRPM

## 2023-10-11 DIAGNOSIS — I10 HYPERTENSION, UNSPECIFIED TYPE: ICD-10-CM

## 2023-10-11 DIAGNOSIS — R51.9 ACUTE NONINTRACTABLE HEADACHE, UNSPECIFIED HEADACHE TYPE: Primary | ICD-10-CM

## 2023-10-11 LAB
ALBUMIN SERPL-MCNC: 3.8 G/DL (ref 3.4–5)
ALBUMIN/GLOB SERPL: 0.7 {RATIO} (ref 1–2)
ALP LIVER SERPL-CCNC: 91 U/L
ALT SERPL-CCNC: 49 U/L
ANION GAP SERPL CALC-SCNC: 9 MMOL/L (ref 0–18)
AST SERPL-CCNC: 27 U/L (ref 15–37)
BASOPHILS # BLD: 0 X10(3) UL (ref 0–0.2)
BASOPHILS NFR BLD: 0 %
BILIRUB SERPL-MCNC: 0.3 MG/DL (ref 0.1–2)
BUN BLD-MCNC: 12 MG/DL (ref 7–18)
CALCIUM BLD-MCNC: 9.3 MG/DL (ref 8.5–10.1)
CHLORIDE SERPL-SCNC: 102 MMOL/L (ref 98–112)
CLARITY UR REFRACT.AUTO: CLEAR
CO2 SERPL-SCNC: 26 MMOL/L (ref 21–32)
CREAT BLD-MCNC: 0.96 MG/DL
EGFRCR SERPLBLD CKD-EPI 2021: 68 ML/MIN/1.73M2 (ref 60–?)
EOSINOPHIL # BLD: 0.29 X10(3) UL (ref 0–0.7)
EOSINOPHIL NFR BLD: 2 %
ERYTHROCYTE [DISTWIDTH] IN BLOOD BY AUTOMATED COUNT: 15.5 %
GLOBULIN PLAS-MCNC: 5.1 G/DL (ref 2.8–4.4)
GLUCOSE BLD-MCNC: 205 MG/DL (ref 70–99)
HCT VFR BLD AUTO: 39.8 %
HGB BLD-MCNC: 13 G/DL
LYMPHOCYTES NFR BLD: 32 %
LYMPHOCYTES NFR BLD: 4.64 X10(3) UL (ref 1–4)
MCH RBC QN AUTO: 25 PG (ref 26–34)
MCHC RBC AUTO-ENTMCNC: 32.7 G/DL (ref 31–37)
MCV RBC AUTO: 76.5 FL
METAMYELOCYTES # BLD: 0.29 X10(3) UL
METAMYELOCYTES NFR BLD: 2 %
MONOCYTES # BLD: 0.87 X10(3) UL (ref 0.1–1)
MONOCYTES NFR BLD: 6 %
MORPHOLOGY: NORMAL
NEUTROPHILS # BLD AUTO: 7.99 X10 (3) UL (ref 1.5–7.7)
NEUTROPHILS NFR BLD: 56 %
NEUTS BAND NFR BLD: 2 %
NEUTS HYPERSEG # BLD: 8.41 X10(3) UL (ref 1.5–7.7)
OSMOLALITY SERPL CALC.SUM OF ELEC: 290 MOSM/KG (ref 275–295)
PLATELET # BLD AUTO: 370 10(3)UL (ref 150–450)
PLATELET MORPHOLOGY: NORMAL
POTASSIUM SERPL-SCNC: 3.2 MMOL/L (ref 3.5–5.1)
PROT SERPL-MCNC: 8.9 G/DL (ref 6.4–8.2)
RBC # BLD AUTO: 5.2 X10(6)UL
RBC #/AREA URNS AUTO: >10 /HPF
SODIUM SERPL-SCNC: 137 MMOL/L (ref 136–145)
TOTAL CELLS COUNTED BLD: 100
TROPONIN I HIGH SENSITIVITY: 18 NG/L
TROPONIN I HIGH SENSITIVITY: 20 NG/L
WBC # BLD AUTO: 14.5 X10(3) UL (ref 4–11)

## 2023-10-11 PROCEDURE — 70551 MRI BRAIN STEM W/O DYE: CPT | Performed by: EMERGENCY MEDICINE

## 2023-10-11 PROCEDURE — 85007 BL SMEAR W/DIFF WBC COUNT: CPT | Performed by: EMERGENCY MEDICINE

## 2023-10-11 PROCEDURE — 99285 EMERGENCY DEPT VISIT HI MDM: CPT

## 2023-10-11 PROCEDURE — 85027 COMPLETE CBC AUTOMATED: CPT | Performed by: EMERGENCY MEDICINE

## 2023-10-11 PROCEDURE — 81015 MICROSCOPIC EXAM OF URINE: CPT | Performed by: EMERGENCY MEDICINE

## 2023-10-11 PROCEDURE — 87086 URINE CULTURE/COLONY COUNT: CPT | Performed by: EMERGENCY MEDICINE

## 2023-10-11 PROCEDURE — 85025 COMPLETE CBC W/AUTO DIFF WBC: CPT | Performed by: EMERGENCY MEDICINE

## 2023-10-11 PROCEDURE — 81001 URINALYSIS AUTO W/SCOPE: CPT | Performed by: EMERGENCY MEDICINE

## 2023-10-11 PROCEDURE — 93010 ELECTROCARDIOGRAM REPORT: CPT

## 2023-10-11 PROCEDURE — 84484 ASSAY OF TROPONIN QUANT: CPT | Performed by: EMERGENCY MEDICINE

## 2023-10-11 PROCEDURE — 71045 X-RAY EXAM CHEST 1 VIEW: CPT | Performed by: EMERGENCY MEDICINE

## 2023-10-11 PROCEDURE — 80053 COMPREHEN METABOLIC PANEL: CPT | Performed by: EMERGENCY MEDICINE

## 2023-10-11 PROCEDURE — 36415 COLL VENOUS BLD VENIPUNCTURE: CPT

## 2023-10-11 PROCEDURE — 93005 ELECTROCARDIOGRAM TRACING: CPT

## 2023-10-11 RX ORDER — PHENAZOPYRIDINE HYDROCHLORIDE 100 MG/1
100 TABLET, FILM COATED ORAL 3 TIMES DAILY PRN
COMMUNITY

## 2023-10-11 NOTE — ED INITIAL ASSESSMENT (HPI)
Pt was in hospital for stent placed last week for kidney stone. Pt was started on meds for pain. Pt states she took pyridium and started having a headache.

## 2023-10-12 LAB
ATRIAL RATE: 99 BPM
P AXIS: 60 DEGREES
P-R INTERVAL: 170 MS
Q-T INTERVAL: 358 MS
QRS DURATION: 86 MS
QTC CALCULATION (BEZET): 459 MS
R AXIS: 46 DEGREES
T AXIS: 48 DEGREES
VENTRICULAR RATE: 99 BPM

## 2023-10-12 NOTE — DISCHARGE INSTRUCTIONS
Follow-up with your primary care doctor within the next 2 to 3 days for reassessment. Continue to monitor your blood pressure at home. Take your home blood pressure medicine as prescribed. Return for any concerning symptoms such as fever, severe pain.

## 2023-10-23 ENCOUNTER — ANESTHESIA EVENT (OUTPATIENT)
Dept: SURGERY | Facility: HOSPITAL | Age: 58
End: 2023-10-23

## 2023-10-23 NOTE — PAT NURSING NOTE
Called ,surgeon office and spoke to Coretta to advise that Clarisse Chopra requires a medical clearance. stated that they have already contacted ,pcp office to address hypertension and potassium.

## 2023-10-27 ENCOUNTER — APPOINTMENT (OUTPATIENT)
Dept: GENERAL RADIOLOGY | Facility: HOSPITAL | Age: 58
End: 2023-10-27
Attending: UROLOGY

## 2023-10-27 ENCOUNTER — HOSPITAL ENCOUNTER (OUTPATIENT)
Facility: HOSPITAL | Age: 58
Setting detail: HOSPITAL OUTPATIENT SURGERY
Discharge: HOME | End: 2023-10-27
Attending: UROLOGY | Admitting: UROLOGY

## 2023-10-27 ENCOUNTER — ANESTHESIA (OUTPATIENT)
Dept: SURGERY | Facility: HOSPITAL | Age: 58
End: 2023-10-27

## 2023-10-27 VITALS
HEIGHT: 66 IN | OXYGEN SATURATION: 100 % | RESPIRATION RATE: 22 BRPM | DIASTOLIC BLOOD PRESSURE: 82 MMHG | SYSTOLIC BLOOD PRESSURE: 153 MMHG | WEIGHT: 234 LBS | BODY MASS INDEX: 37.61 KG/M2 | HEART RATE: 97 BPM | TEMPERATURE: 97 F

## 2023-10-27 LAB
GLUCOSE BLD-MCNC: 127 MG/DL (ref 70–99)
GLUCOSE BLD-MCNC: 140 MG/DL (ref 70–99)

## 2023-10-27 PROCEDURE — 82962 GLUCOSE BLOOD TEST: CPT

## 2023-10-27 PROCEDURE — 0TC08ZZ EXTIRPATION OF MATTER FROM RIGHT KIDNEY, VIA NATURAL OR ARTIFICIAL OPENING ENDOSCOPIC: ICD-10-PCS | Performed by: UROLOGY

## 2023-10-27 PROCEDURE — 88300 SURGICAL PATH GROSS: CPT | Performed by: UROLOGY

## 2023-10-27 PROCEDURE — 0T768DZ DILATION OF RIGHT URETER WITH INTRALUMINAL DEVICE, VIA NATURAL OR ARTIFICIAL OPENING ENDOSCOPIC: ICD-10-PCS | Performed by: UROLOGY

## 2023-10-27 PROCEDURE — 82365 CALCULUS SPECTROSCOPY: CPT | Performed by: UROLOGY

## 2023-10-27 DEVICE — URETERAL STENT
Type: IMPLANTABLE DEVICE | Site: URETER | Status: FUNCTIONAL
Brand: ASCERTA™

## 2023-10-27 RX ORDER — NICOTINE POLACRILEX 4 MG
30 LOZENGE BUCCAL
Status: DISCONTINUED | OUTPATIENT
Start: 2023-10-27 | End: 2023-10-27 | Stop reason: HOSPADM

## 2023-10-27 RX ORDER — TRIAMTERENE AND HYDROCHLOROTHIAZIDE 37.5; 25 MG/1; MG/1
1 CAPSULE ORAL EVERY MORNING
COMMUNITY
Start: 2023-10-30

## 2023-10-27 RX ORDER — LIDOCAINE HYDROCHLORIDE 20 MG/ML
JELLY TOPICAL AS NEEDED
Status: DISCONTINUED | OUTPATIENT
Start: 2023-10-27 | End: 2023-10-27 | Stop reason: HOSPADM

## 2023-10-27 RX ORDER — HYDROMORPHONE HYDROCHLORIDE 1 MG/ML
0.6 INJECTION, SOLUTION INTRAMUSCULAR; INTRAVENOUS; SUBCUTANEOUS EVERY 5 MIN PRN
Status: DISCONTINUED | OUTPATIENT
Start: 2023-10-27 | End: 2023-10-27

## 2023-10-27 RX ORDER — MIDAZOLAM HYDROCHLORIDE 1 MG/ML
INJECTION INTRAMUSCULAR; INTRAVENOUS AS NEEDED
Status: DISCONTINUED | OUTPATIENT
Start: 2023-10-27 | End: 2023-10-27 | Stop reason: SURG

## 2023-10-27 RX ORDER — MEPERIDINE HYDROCHLORIDE 25 MG/ML
25 INJECTION INTRAMUSCULAR; INTRAVENOUS; SUBCUTANEOUS
Status: DISCONTINUED | OUTPATIENT
Start: 2023-10-27 | End: 2023-10-27

## 2023-10-27 RX ORDER — HYDROMORPHONE HYDROCHLORIDE 1 MG/ML
0.2 INJECTION, SOLUTION INTRAMUSCULAR; INTRAVENOUS; SUBCUTANEOUS EVERY 5 MIN PRN
Status: DISCONTINUED | OUTPATIENT
Start: 2023-10-27 | End: 2023-10-27

## 2023-10-27 RX ORDER — DIAZEPAM 10 MG/1
TABLET ORAL
COMMUNITY
Start: 2023-10-25

## 2023-10-27 RX ORDER — HYDROMORPHONE HYDROCHLORIDE 1 MG/ML
0.4 INJECTION, SOLUTION INTRAMUSCULAR; INTRAVENOUS; SUBCUTANEOUS EVERY 5 MIN PRN
Status: DISCONTINUED | OUTPATIENT
Start: 2023-10-27 | End: 2023-10-27

## 2023-10-27 RX ORDER — MIDAZOLAM HYDROCHLORIDE 1 MG/ML
1 INJECTION INTRAMUSCULAR; INTRAVENOUS EVERY 5 MIN PRN
Status: DISCONTINUED | OUTPATIENT
Start: 2023-10-27 | End: 2023-10-27

## 2023-10-27 RX ORDER — NICOTINE POLACRILEX 4 MG
15 LOZENGE BUCCAL
Status: DISCONTINUED | OUTPATIENT
Start: 2023-10-27 | End: 2023-10-27 | Stop reason: HOSPADM

## 2023-10-27 RX ORDER — LABETALOL HYDROCHLORIDE 5 MG/ML
10 INJECTION, SOLUTION INTRAVENOUS EVERY 10 MIN PRN
Status: DISCONTINUED | OUTPATIENT
Start: 2023-10-27 | End: 2023-10-27

## 2023-10-27 RX ORDER — OMEPRAZOLE 40 MG/1
40 CAPSULE, DELAYED RELEASE ORAL
COMMUNITY
Start: 2023-07-17

## 2023-10-27 RX ORDER — ONDANSETRON 2 MG/ML
INJECTION INTRAMUSCULAR; INTRAVENOUS AS NEEDED
Status: DISCONTINUED | OUTPATIENT
Start: 2023-10-27 | End: 2023-10-27 | Stop reason: SURG

## 2023-10-27 RX ORDER — HYDRALAZINE HYDROCHLORIDE 100 MG/1
100 TABLET, FILM COATED ORAL 2 TIMES DAILY
COMMUNITY
Start: 2023-10-18

## 2023-10-27 RX ORDER — LIDOCAINE HYDROCHLORIDE 10 MG/ML
INJECTION, SOLUTION EPIDURAL; INFILTRATION; INTRACAUDAL; PERINEURAL AS NEEDED
Status: DISCONTINUED | OUTPATIENT
Start: 2023-10-27 | End: 2023-10-27 | Stop reason: SURG

## 2023-10-27 RX ORDER — CEFAZOLIN SODIUM/WATER 2 G/20 ML
SYRINGE (ML) INTRAVENOUS
Status: DISCONTINUED
Start: 2023-10-27 | End: 2023-10-27

## 2023-10-27 RX ORDER — DEXTROSE MONOHYDRATE 25 G/50ML
50 INJECTION, SOLUTION INTRAVENOUS
Status: DISCONTINUED | OUTPATIENT
Start: 2023-10-27 | End: 2023-10-27 | Stop reason: HOSPADM

## 2023-10-27 RX ORDER — NALOXONE HYDROCHLORIDE 0.4 MG/ML
0.08 INJECTION, SOLUTION INTRAMUSCULAR; INTRAVENOUS; SUBCUTANEOUS AS NEEDED
Status: DISCONTINUED | OUTPATIENT
Start: 2023-10-27 | End: 2023-10-27

## 2023-10-27 RX ORDER — KETOROLAC TROMETHAMINE 30 MG/ML
INJECTION, SOLUTION INTRAMUSCULAR; INTRAVENOUS AS NEEDED
Status: DISCONTINUED | OUTPATIENT
Start: 2023-10-27 | End: 2023-10-27 | Stop reason: SURG

## 2023-10-27 RX ORDER — LABETALOL 100 MG/1
100 TABLET, FILM COATED ORAL
COMMUNITY
Start: 2023-10-18

## 2023-10-27 RX ORDER — ACETAMINOPHEN 500 MG
1000 TABLET ORAL ONCE
Status: DISCONTINUED | OUTPATIENT
Start: 2023-10-27 | End: 2023-10-27 | Stop reason: HOSPADM

## 2023-10-27 RX ORDER — METOCLOPRAMIDE HYDROCHLORIDE 5 MG/ML
INJECTION INTRAMUSCULAR; INTRAVENOUS AS NEEDED
Status: DISCONTINUED | OUTPATIENT
Start: 2023-10-27 | End: 2023-10-27 | Stop reason: SURG

## 2023-10-27 RX ORDER — SCOLOPAMINE TRANSDERMAL SYSTEM 1 MG/1
1 PATCH, EXTENDED RELEASE TRANSDERMAL ONCE
Status: DISCONTINUED | OUTPATIENT
Start: 2023-10-27 | End: 2023-10-27 | Stop reason: HOSPADM

## 2023-10-27 RX ORDER — CEFAZOLIN SODIUM/WATER 2 G/20 ML
2 SYRINGE (ML) INTRAVENOUS ONCE
Status: COMPLETED | OUTPATIENT
Start: 2023-10-27 | End: 2023-10-27

## 2023-10-27 RX ORDER — HYDRALAZINE HYDROCHLORIDE 20 MG/ML
20 INJECTION INTRAMUSCULAR; INTRAVENOUS ONCE
Status: COMPLETED | OUTPATIENT
Start: 2023-10-27 | End: 2023-10-27

## 2023-10-27 RX ORDER — SODIUM CHLORIDE, SODIUM LACTATE, POTASSIUM CHLORIDE, CALCIUM CHLORIDE 600; 310; 30; 20 MG/100ML; MG/100ML; MG/100ML; MG/100ML
INJECTION, SOLUTION INTRAVENOUS CONTINUOUS
Status: DISCONTINUED | OUTPATIENT
Start: 2023-10-27 | End: 2023-10-27

## 2023-10-27 RX ADMIN — ONDANSETRON 4 MG: 2 INJECTION INTRAMUSCULAR; INTRAVENOUS at 14:00:00

## 2023-10-27 RX ADMIN — MIDAZOLAM HYDROCHLORIDE 2 MG: 1 INJECTION INTRAMUSCULAR; INTRAVENOUS at 12:22:00

## 2023-10-27 RX ADMIN — SODIUM CHLORIDE, SODIUM LACTATE, POTASSIUM CHLORIDE, CALCIUM CHLORIDE: 600; 310; 30; 20 INJECTION, SOLUTION INTRAVENOUS at 13:10:00

## 2023-10-27 RX ADMIN — SODIUM CHLORIDE, SODIUM LACTATE, POTASSIUM CHLORIDE, CALCIUM CHLORIDE: 600; 310; 30; 20 INJECTION, SOLUTION INTRAVENOUS at 12:22:00

## 2023-10-27 RX ADMIN — METOCLOPRAMIDE HYDROCHLORIDE 10 MG: 5 INJECTION INTRAMUSCULAR; INTRAVENOUS at 14:00:00

## 2023-10-27 RX ADMIN — METOCLOPRAMIDE HYDROCHLORIDE 10 MG: 5 INJECTION INTRAMUSCULAR; INTRAVENOUS at 13:10:00

## 2023-10-27 RX ADMIN — CEFAZOLIN SODIUM/WATER 2 G: 2 G/20 ML SYRINGE (ML) INTRAVENOUS at 12:23:00

## 2023-10-27 RX ADMIN — KETOROLAC TROMETHAMINE 30 MG: 30 INJECTION, SOLUTION INTRAMUSCULAR; INTRAVENOUS at 14:00:00

## 2023-10-27 RX ADMIN — LIDOCAINE HYDROCHLORIDE 10 MG: 10 INJECTION, SOLUTION EPIDURAL; INFILTRATION; INTRACAUDAL; PERINEURAL at 12:24:00

## 2023-10-27 NOTE — INTERVAL H&P NOTE
Pre-op Diagnosis: URETERAL STONE WITH HYDRONEPHROSIS    The above referenced H&P was reviewed by Aureliano Mary MD on 10/27/2023, the patient was examined and no significant changes have occurred in the patient's condition since the H&P was performed. I discussed with the patient and/or legal representative the potential benefits, risks and side effects of this procedure; the likelihood of the patient achieving goals; and potential problems that might occur during recuperation. I discussed reasonable alternatives to the procedure, including risks, benefits and side effects related to the alternatives and risks related to not receiving this procedure. We will proceed with procedure as planned. PCP has been helping with her high BP  Had surgery with me earlier in the month and ureter stent was placed. Marjo Solid was too obstructing to get a ureteroscope up to it to treat the stone  Now returns back for repeat ureteroscopy  Tolerating the bladder pressure. Not taking any meds  Reviewed and marked right side  Knows there will still be a stent post op    Right ureter stone with obstruction    Surgical risks, benefits and alternatives discussed. Risks of ureteroscopy including but not limited to infection, bleeding, perforation, possible need for stent replacement, need for subsequent removal of stent, ureteral spasm, ureteral injury or stricture discussed with patient along with risks of anesthesia which could include death.        Patient will proceed with cystoscopy, right ureteroscopy, laser lithotripsy, stone extraction, possible stent replacement  Advised her to stay on her BP meds as instructed by her PCP

## 2023-10-27 NOTE — ANESTHESIA PROCEDURE NOTES
Airway  Date/Time: 10/27/2023 12:25 PM  Urgency: elective    Airway not difficult    General Information and Staff    Patient location during procedure: OR  Anesthesiologist: Valeria Argueta MD  Performed: anesthesiologist   Performed by: Valeria Argueta MD  Authorized by:  Valeria Argueta MD      Indications and Patient Condition  Indications for airway management: anesthesia  Spontaneous ventilation: present  Sedation level: deep  Preoxygenated: yes  Patient position: sniffing  Mask difficulty assessment: 1 - vent by mask    Final Airway Details  Final airway type: supraglottic airway      Successful airway: classic  Size 3       Number of attempts at approach: 1  Ventilation between attempts: none  Number of other approaches attempted: 0

## 2023-10-27 NOTE — ANESTHESIA POSTPROCEDURE EVALUATION
Lilo Pa Patient Status:  Hospital Outpatient Surgery   Age/Gender 60year old female MRN [id number]   [facility] SURGERY Attending Dianne Mims MD   Hosp Day # 0 PCP Kortney Montez MD       Anesthesia Post-op Note    CYSTOSCOPY, RIGHT URETEROSCOPY WITH HOLMIUM LASER LITHOTRIPSY, RIGHT URETERAL STONE EXTRACTION, RIGHT RETROGRADE PYELOGRAM, RIGHT URETERAL STENT RE-PLACEMENT    Procedure Summary       Date: 10/27/23 Room / Location: [facility] MAIN OR 07 / [facility] MAIN OR    Anesthesia Start: 3274 Anesthesia Stop:     Procedure: CYSTOSCOPY, RIGHT URETEROSCOPY WITH HOLMIUM LASER LITHOTRIPSY, RIGHT URETERAL STONE EXTRACTION, RIGHT RETROGRADE PYELOGRAM, RIGHT URETERAL STENT RE-PLACEMENT (Right: Ureter) Diagnosis: (same as pre-op)    Surgeons: Dianne Mims MD Anesthesiologist: Luis Levin MD    Anesthesia Type: general ASA Status: 2            Anesthesia Type: general    Vitals Value Taken Time   /75 10/27/23 1414   Temp 98 10/27/23 1414   Pulse 65 10/27/23 1414   Resp 16 10/27/23 1414   SpO2 98 10/27/23 1414       Patient Location: PACU    Anesthesia Type: general    Airway Patency: patent    Postop Pain Control: adequate    Mental Status: mildly sedated but able to meaningfully participate in the post-anesthesia evaluation    Nausea/Vomiting: none    Cardiopulmonary/Hydration status: stable euvolemic    Complications: no apparent anesthesia related complications    Postop vital signs: stable    Dental Exam: Unchanged from Preop    Patient to be discharged from PACU when criteria met.

## 2023-10-27 NOTE — OPERATIVE REPORT
[facility]    Operative Note         Shannon Brennan Location: OR   CSN [id number] MRN [id number]   Admission Date 10/27/2023 Operation Date 10/27/2023   Attending Physician Christiane Loredo MD       Patient Name: Shannon Brennan     Preoperative Diagnosis: URETERAL STONE WITH HYDRONEPHROSIS     Postoperative Diagnosis: same as pre-op     Procedure(s):  CYSTOSCOPY, RIGHT URETEROSCOPY WITH HOLMIUM LASER LITHOTRIPSY, RIGHT URETERAL STONE EXTRACTION, RIGHT RETROGRADE PYELOGRAM, RIGHT URETERAL STENT RE-PLACEMENT, intraoperative interpretation of fluoroscopic images     Primary Surgeon: Juan Francisco Barker MD           Anesthesia: General     Specimen:   ID Type Source Tests Collected by Time Destination   1 : right ureteral calculus Calculus Stone (calculus) CALCULI, URINARY, SURGICAL PATHOLOGY TISSUE Christiane Loredo MD 10/27/2023 1359         Estimated Blood Loss: No data recorded     Complications: none       Indications for procedure: obstructing right ureter stone, s/p 4.8Fr  stent placement with last cystoscopy procedure. Surgical Findings: very large and impacted right ureter stone in the distal ureter. Lasered and ultimately was able to pass beyond this obstructing region into the markedly dilated right ureter proximally. Fragments flushing, some sand sent for analysis, upsized stent from 4.8 to 6Fr     Complexity: extended lasering and ureteroscopic time due to the large size of this obstructing stone    Operative Summary:    SPECIMENS:  ureter Stone fragments     DRAINS: 6Fr x 28cm     ESTIMATED BLOOD LOSS: Less than 5 mL. INDICATION FOR PROCEDURE: [place] Figures is a 60year old year old year-old female who was diagnosed with severe right hydroureteronephrosis to a distal ureter stone. She is s/p ureter stent placement. Desires surgical intervention.   Understands the risks of the procedure, including but not limited to the general anesthetic involved, urinary bleeding, infection, bladder injury, ureteral injury, renal injury, inability to extract the large stone due to its large nature, the need for laser lithotripsy with subsequent passage of particles, the need for ureteral stent replacement with subsequent removal, the risks for ureteral stricture disease, stone persistence, re-formation, and the unilateral nature of this procedure. Would like to proceed. Right side confirmed     PROCEDURE IN DETAIL: The patient was brought to the operating room after being correctly identified and surgical consent was obtained. All questions were answered. General anesthesia was induced without difficulty. Legs were placed in the dorsal lithotomy position, padded and secured, and the working area was prepped and draped in the usual sterile fashion. Examination by  fluoroscopy showed a low hanging ureter stent traversing across this large distal ureter stone on the RIGHT. A 21-Citizen of the [place] scope was inserted per urethra. The urethra was normal. Upon entry into the bladder, the urine was grossly clear. Panendoscopy was performed and showed no evidence of any bladder tumors, diverticula, or stones. The ureteral orifices were in normal position. The right sided ureter stent was grasped out the meatus. Wire fed up into the kidney. Semi rigid ureteroscope passed alongside. Still very edematous at the site of the stone obstruction. Second wire was passed as well. This time contrast would go proximal to this stone into severe hydroureter. I was able to unroof this large stone. 365 micron holmium laser fiber was fed through. 0.4-0.6J settings were used to fracture the stone. It did require extensive lasering and manipulation with the scope to get the stone freed off the surface of the ureter. Ultimately  I was able to get the scope beyond this region into the dilated ureter. Urine was grossly clear. Stone fragments are passing into the bladder. On retrograde, there is no extravasation At the imapacted site.    There is still significant hydroureteronephrosis down to the site. With The wire was positioned, a 6fr x 28cm double J ureter stent was deployed with the proximal pigtail in the kidney and the distal pigtail in the bladder. Lidocaine jelly was instilled per urethra. The patient was returned to the supine position, extubated, and transferred to the recovery room in stable condition. I was present and performed the entire procedure as stated above. There were no complications. KUB will be evaluated in the next 2 weeks. A repeat ureteroscopic second look is anticipated but will be discussed at her post op visit. Implants:   Implant Name Type Inv.  Item Serial No.  Lot No. LRB No. Used Action   ASCERTA STENT URET [id number] C/Casia [id number] [id number] Right 1 Implanted       Condition: stable       Brenda Harris MD

## 2023-11-06 LAB
CAOX DIHYDRATE: 40 %
CAOX MONOHYDRATE: 55 %
HYDROXYAPATITE: 5 %
WEIGHT-STONE: 4 MG

## 2023-11-18 ENCOUNTER — ANESTHESIA EVENT (OUTPATIENT)
Dept: SURGERY | Facility: HOSPITAL | Age: 58
End: 2023-11-18
Payer: COMMERCIAL

## 2023-11-20 ENCOUNTER — HOSPITAL ENCOUNTER (OUTPATIENT)
Dept: GENERAL RADIOLOGY | Facility: HOSPITAL | Age: 58
Setting detail: HOSPITAL OUTPATIENT SURGERY
Discharge: HOME | End: 2023-11-20
Attending: UROLOGY
Payer: COMMERCIAL

## 2023-11-20 ENCOUNTER — ANESTHESIA (OUTPATIENT)
Dept: SURGERY | Facility: HOSPITAL | Age: 58
End: 2023-11-20
Payer: COMMERCIAL

## 2023-11-20 ENCOUNTER — HOSPITAL ENCOUNTER (OUTPATIENT)
Facility: HOSPITAL | Age: 58
Setting detail: HOSPITAL OUTPATIENT SURGERY
Discharge: HOME | End: 2023-11-20
Attending: UROLOGY | Admitting: UROLOGY
Payer: COMMERCIAL

## 2023-11-20 VITALS
TEMPERATURE: 98 F | HEART RATE: 88 BPM | OXYGEN SATURATION: 98 % | SYSTOLIC BLOOD PRESSURE: 127 MMHG | RESPIRATION RATE: 14 BRPM | BODY MASS INDEX: 37.77 KG/M2 | WEIGHT: 235 LBS | HEIGHT: 66 IN | DIASTOLIC BLOOD PRESSURE: 73 MMHG

## 2023-11-20 DIAGNOSIS — R80.9 PROTEINURIA: ICD-10-CM

## 2023-11-20 PROBLEM — N13.2 URETERAL STONE WITH HYDRONEPHROSIS: Status: ACTIVE | Noted: 2023-11-20

## 2023-11-20 LAB
GLUCOSE BLDC GLUCOMTR-MCNC: 144 MG/DL (ref 70–99)
GLUCOSE BLDC GLUCOMTR-MCNC: 149 MG/DL (ref 70–99)

## 2023-11-20 PROCEDURE — 0TC68ZZ EXTIRPATION OF MATTER FROM RIGHT URETER, VIA NATURAL OR ARTIFICIAL OPENING ENDOSCOPIC: ICD-10-PCS | Performed by: UROLOGY

## 2023-11-20 PROCEDURE — 82962 GLUCOSE BLOOD TEST: CPT

## 2023-11-20 PROCEDURE — BT1D1ZZ FLUOROSCOPY OF RIGHT KIDNEY, URETER AND BLADDER USING LOW OSMOLAR CONTRAST: ICD-10-PCS | Performed by: UROLOGY

## 2023-11-20 PROCEDURE — 0T768DZ DILATION OF RIGHT URETER WITH INTRALUMINAL DEVICE, VIA NATURAL OR ARTIFICIAL OPENING ENDOSCOPIC: ICD-10-PCS | Performed by: UROLOGY

## 2023-11-20 DEVICE — URETERAL STENT
Type: IMPLANTABLE DEVICE | Site: URETER | Status: FUNCTIONAL
Brand: ASCERTA™

## 2023-11-20 RX ORDER — LABETALOL HYDROCHLORIDE 5 MG/ML
5 INJECTION, SOLUTION INTRAVENOUS EVERY 5 MIN PRN
Status: DISCONTINUED | OUTPATIENT
Start: 2023-11-20 | End: 2023-11-20

## 2023-11-20 RX ORDER — DEXTROSE MONOHYDRATE 25 G/50ML
50 INJECTION, SOLUTION INTRAVENOUS
Status: DISCONTINUED | OUTPATIENT
Start: 2023-11-20 | End: 2023-11-20

## 2023-11-20 RX ORDER — SODIUM CHLORIDE, SODIUM LACTATE, POTASSIUM CHLORIDE, CALCIUM CHLORIDE 600; 310; 30; 20 MG/100ML; MG/100ML; MG/100ML; MG/100ML
INJECTION, SOLUTION INTRAVENOUS CONTINUOUS
Status: DISCONTINUED | OUTPATIENT
Start: 2023-11-20 | End: 2023-11-20

## 2023-11-20 RX ORDER — LIDOCAINE HYDROCHLORIDE 10 MG/ML
INJECTION, SOLUTION EPIDURAL; INFILTRATION; INTRACAUDAL; PERINEURAL AS NEEDED
Status: DISCONTINUED | OUTPATIENT
Start: 2023-11-20 | End: 2023-11-20 | Stop reason: SURG

## 2023-11-20 RX ORDER — IOPAMIDOL 612 MG/ML
INJECTION, SOLUTION INTRATHECAL AS NEEDED
Status: DISCONTINUED | OUTPATIENT
Start: 2023-11-20 | End: 2023-11-20 | Stop reason: HOSPADM

## 2023-11-20 RX ORDER — MEPERIDINE HYDROCHLORIDE 25 MG/ML
12.5 INJECTION INTRAMUSCULAR; INTRAVENOUS; SUBCUTANEOUS AS NEEDED
Status: DISCONTINUED | OUTPATIENT
Start: 2023-11-20 | End: 2023-11-20

## 2023-11-20 RX ORDER — MIDAZOLAM HYDROCHLORIDE 1 MG/ML
INJECTION INTRAMUSCULAR; INTRAVENOUS AS NEEDED
Status: DISCONTINUED | OUTPATIENT
Start: 2023-11-20 | End: 2023-11-20 | Stop reason: SURG

## 2023-11-20 RX ORDER — PHENAZOPYRIDINE HYDROCHLORIDE 100 MG/1
100 TABLET, FILM COATED ORAL 3 TIMES DAILY PRN
Status: SHIPPED | COMMUNITY
Start: 2023-11-20

## 2023-11-20 RX ORDER — GLYCOPYRROLATE 0.2 MG/ML
INJECTION, SOLUTION INTRAMUSCULAR; INTRAVENOUS AS NEEDED
Status: DISCONTINUED | OUTPATIENT
Start: 2023-11-20 | End: 2023-11-20 | Stop reason: SURG

## 2023-11-20 RX ORDER — LIDOCAINE HYDROCHLORIDE 20 MG/ML
JELLY TOPICAL AS NEEDED
Status: DISCONTINUED | OUTPATIENT
Start: 2023-11-20 | End: 2023-11-20 | Stop reason: HOSPADM

## 2023-11-20 RX ORDER — NICOTINE POLACRILEX 4 MG
30 LOZENGE BUCCAL
Status: DISCONTINUED | OUTPATIENT
Start: 2023-11-20 | End: 2023-11-20

## 2023-11-20 RX ORDER — ACETAMINOPHEN 500 MG
1000 TABLET ORAL ONCE
Status: COMPLETED | OUTPATIENT
Start: 2023-11-20 | End: 2023-11-20

## 2023-11-20 RX ORDER — CEFAZOLIN SODIUM/WATER 2 G/20 ML
2 SYRINGE (ML) INTRAVENOUS ONCE
Status: COMPLETED | OUTPATIENT
Start: 2023-11-20 | End: 2023-11-20

## 2023-11-20 RX ORDER — EPHEDRINE SULFATE 50 MG/ML
INJECTION INTRAVENOUS AS NEEDED
Status: DISCONTINUED | OUTPATIENT
Start: 2023-11-20 | End: 2023-11-20 | Stop reason: SURG

## 2023-11-20 RX ORDER — FAMOTIDINE 20 MG/1
20 TABLET, FILM COATED ORAL ONCE
Status: COMPLETED | OUTPATIENT
Start: 2023-11-20 | End: 2023-11-20

## 2023-11-20 RX ORDER — FAMOTIDINE 10 MG/ML
20 INJECTION, SOLUTION INTRAVENOUS ONCE
Status: COMPLETED | OUTPATIENT
Start: 2023-11-20 | End: 2023-11-20

## 2023-11-20 RX ORDER — ONDANSETRON 2 MG/ML
4 INJECTION INTRAMUSCULAR; INTRAVENOUS EVERY 6 HOURS PRN
Status: DISCONTINUED | OUTPATIENT
Start: 2023-11-20 | End: 2023-11-20

## 2023-11-20 RX ORDER — ROCURONIUM BROMIDE 10 MG/ML
INJECTION, SOLUTION INTRAVENOUS AS NEEDED
Status: DISCONTINUED | OUTPATIENT
Start: 2023-11-20 | End: 2023-11-20 | Stop reason: SURG

## 2023-11-20 RX ORDER — NICOTINE POLACRILEX 4 MG
15 LOZENGE BUCCAL
Status: DISCONTINUED | OUTPATIENT
Start: 2023-11-20 | End: 2023-11-20

## 2023-11-20 RX ORDER — METOCLOPRAMIDE HYDROCHLORIDE 5 MG/ML
INJECTION INTRAMUSCULAR; INTRAVENOUS AS NEEDED
Status: DISCONTINUED | OUTPATIENT
Start: 2023-11-20 | End: 2023-11-20 | Stop reason: SURG

## 2023-11-20 RX ORDER — PHENAZOPYRIDINE HYDROCHLORIDE 200 MG/1
200 TABLET, FILM COATED ORAL ONCE AS NEEDED
Status: COMPLETED | OUTPATIENT
Start: 2023-11-20 | End: 2023-11-20

## 2023-11-20 RX ORDER — NALOXONE HYDROCHLORIDE 0.4 MG/ML
0.08 INJECTION, SOLUTION INTRAMUSCULAR; INTRAVENOUS; SUBCUTANEOUS AS NEEDED
Status: DISCONTINUED | OUTPATIENT
Start: 2023-11-20 | End: 2023-11-20

## 2023-11-20 RX ORDER — PHENYLEPHRINE HCL 10 MG/ML
VIAL (ML) INJECTION AS NEEDED
Status: DISCONTINUED | OUTPATIENT
Start: 2023-11-20 | End: 2023-11-20 | Stop reason: SURG

## 2023-11-20 RX ORDER — METOCLOPRAMIDE HYDROCHLORIDE 5 MG/ML
10 INJECTION INTRAMUSCULAR; INTRAVENOUS ONCE
Status: COMPLETED | OUTPATIENT
Start: 2023-11-20 | End: 2023-11-20

## 2023-11-20 RX ORDER — HYDROMORPHONE HYDROCHLORIDE 1 MG/ML
0.2 INJECTION, SOLUTION INTRAMUSCULAR; INTRAVENOUS; SUBCUTANEOUS EVERY 5 MIN PRN
Status: DISCONTINUED | OUTPATIENT
Start: 2023-11-20 | End: 2023-11-20

## 2023-11-20 RX ORDER — METOCLOPRAMIDE 10 MG/1
10 TABLET ORAL ONCE
Status: COMPLETED | OUTPATIENT
Start: 2023-11-20 | End: 2023-11-20

## 2023-11-20 RX ORDER — ONDANSETRON 2 MG/ML
INJECTION INTRAMUSCULAR; INTRAVENOUS AS NEEDED
Status: DISCONTINUED | OUTPATIENT
Start: 2023-11-20 | End: 2023-11-20 | Stop reason: SURG

## 2023-11-20 RX ADMIN — PHENYLEPHRINE HCL 200 MCG: 10 MG/ML VIAL (ML) INJECTION at 10:32:00

## 2023-11-20 RX ADMIN — CEFAZOLIN SODIUM/WATER 2 G: 2 G/20 ML SYRINGE (ML) INTRAVENOUS at 10:19:00

## 2023-11-20 RX ADMIN — PHENYLEPHRINE HCL 100 MCG: 10 MG/ML VIAL (ML) INJECTION at 10:35:00

## 2023-11-20 RX ADMIN — SODIUM CHLORIDE, SODIUM LACTATE, POTASSIUM CHLORIDE, CALCIUM CHLORIDE: 600; 310; 30; 20 INJECTION, SOLUTION INTRAVENOUS at 11:12:00

## 2023-11-20 RX ADMIN — ONDANSETRON 4 MG: 2 INJECTION INTRAMUSCULAR; INTRAVENOUS at 10:54:00

## 2023-11-20 RX ADMIN — ROCURONIUM BROMIDE 10 MG: 10 INJECTION, SOLUTION INTRAVENOUS at 10:25:00

## 2023-11-20 RX ADMIN — PHENYLEPHRINE HCL 100 MCG: 10 MG/ML VIAL (ML) INJECTION at 10:30:00

## 2023-11-20 RX ADMIN — MIDAZOLAM HYDROCHLORIDE 2 MG: 1 INJECTION INTRAMUSCULAR; INTRAVENOUS at 10:21:00

## 2023-11-20 RX ADMIN — SODIUM CHLORIDE, SODIUM LACTATE, POTASSIUM CHLORIDE, CALCIUM CHLORIDE: 600; 310; 30; 20 INJECTION, SOLUTION INTRAVENOUS at 10:21:00

## 2023-11-20 RX ADMIN — GLYCOPYRROLATE 0.2 MG: 0.2 INJECTION, SOLUTION INTRAMUSCULAR; INTRAVENOUS at 10:25:00

## 2023-11-20 RX ADMIN — PHENYLEPHRINE HCL 100 MCG: 10 MG/ML VIAL (ML) INJECTION at 10:48:00

## 2023-11-20 RX ADMIN — EPHEDRINE SULFATE 10 MG: 50 INJECTION INTRAVENOUS at 10:46:00

## 2023-11-20 RX ADMIN — LIDOCAINE HYDROCHLORIDE 25 MG: 10 INJECTION, SOLUTION EPIDURAL; INFILTRATION; INTRACAUDAL; PERINEURAL at 10:25:00

## 2023-11-20 RX ADMIN — PHENYLEPHRINE HCL 100 MCG: 10 MG/ML VIAL (ML) INJECTION at 10:40:00

## 2023-11-20 RX ADMIN — METOCLOPRAMIDE HYDROCHLORIDE 10 MG: 5 INJECTION INTRAMUSCULAR; INTRAVENOUS at 10:25:00

## 2023-11-20 NOTE — OPERATIVE REPORT
[facility]    Operative Note         Ed Turner Location: OR   [facility] [id number] MRN [id number]   Admission Date 11/20/2023 Operation Date 11/20/2023   Attending Physician Shankar Velazquez MD       Patient Name: Ed Turner     Preoperative Diagnosis: Right hydronephrosis, right ureteral calculi     Postoperative Diagnosis: Right hydronephrosis, right ureteral calculi     Procedure(s):  Cystoscopy, right ureteroscopy, right ureteral stone extraction, right ureteral stent replacement, retrograde pyelogram  CYSTOSCOPY STENT INSERTION  CYSTOSCOPY RETROGRADE   intraoperative interpretation of fluoroscopic images     Primary Surgeon: Deysi Whitten MD           Anesthesia: General     Specimen: * No specimens in log *   Stone fragments not resent    Estimated Blood Loss: <4CV    Complications: none       Indications for procedure: large distal ureter stone for second look ureteroscopy after prior laser lithotripsy     Surgical Findings: significant inflammatory change and scar still persists in this distal ureter site where stone was previously impacted. Some residual stone fragments freed from the surface and extracted. Proximal hydroureter now aspirates out (clear) with the ability to resolve the prior tortuosity of the right proximal and mid ureter. Moderate hydronephrosis persists from the chronic nature of the obstruction. Complexity:  NA (optional)    Operative Summary:       DRAINS: 6Fr x 24cm double J stent, no string       INDICATION FOR PROCEDURE: Franco Mcdermott is a 60year old year old year-old female who was diagnosed with a large obstructing right ureter stone that has been treated once with laser lithotripsy. Stent is still in placed. Comes in today for a second look ureteroscopy. Desires surgical intervention.   Understands the risks of the procedure, including but not limited to the general anesthetic involved, urinary bleeding, infection, bladder injury, ureteral injury, renal injury, inability to extract large stone, the need for laser lithotripsy with subsequent passage of particles, the possible need for ureteral stent with subsequent removal, the risks for ureteral stricture disease, stone persistence, re-formation, and the unilateral nature of this procedure. Would like to proceed. Right side confirmed  She has been tolerating her right ureter stent without the need for Rx pain meds. PROCEDURE IN DETAIL: The patient was brought to the operating room after being correctly identified and surgical consent was obtained. All questions were answered. General anesthesia was induced without difficulty. Legs were placed in the dorsal lithotomy position, padded and secured, and the working area was prepped and draped in the usual sterile fashion. Examination by  fluoroscopy showed the stent in good position. Still some residual tortuosity of the ureter proximally. I did not see any large stone residual. A 21-Estonian scope was inserted per urethra. The urethra was normal. Upon entry into the bladder, the urine was grossly clear. Panendoscopy was performed and showed no evidence of any bladder tumors, diverticula, or stones. The ureteral orifices were in normal position. The right sided stent was grasped out and wire passed up into the kidney. A semi rigid ureteroscope was then passed along side the wire. There is still edema at this prior stone site in the distal ureter. Some scar change. Some stone residual against the wall, using the blunt end of the scope and the tip of a basket, these pieces were lifted off the ureter surface until there was no more visible stone burden. Scope can pass easily beyond this area now. Ureter proximal to it is still dilated. Urine is otherwise clear. No pieces were large enough to need more lasering. Basket was used to extract out these pieces. Did not send any more pieces for stone analysis as we did last time.  I was able to aspirate out the hydronephrosis and straighten out the tortuosity of the proximal ureter. The wire was positioned, a 6fr x 24cm double J ureter stent was deployed with the proximal pigtail in the kidney and the distal pigtail in the bladder. Lidocaine jelly was instilled per urethra. The patient was returned to the supine position, extubated, and transferred to the recovery room in stable condition. I was present and performed the entire procedure as stated above. There were no complications. The stent will be removed next week in the office. We will discuss when to reimage the kidney and ureter. She is still working with her PCP on her high blood pressure. Implants:   Implant Name Type Inv.  Item Serial No.  Lot No. LRB No. Used Action   ASCERTA STENT URET [id number] - SNA  ASCERTA James Dus 6FR NA Whereoscope [address] [id number] Implanted          Condition: stable       Ray Gonzalez MD

## 2023-11-20 NOTE — INTERVAL H&P NOTE
Pre-op Diagnosis: Right hydronephrosis, right renal calculi    The above referenced H&P was reviewed by Christian Schwarz MD on 11/20/2023, the patient was examined and no significant changes have occurred in the patient's condition since the H&P was performed. I discussed with the patient and/or legal representative the potential benefits, risks and side effects of this procedure; the likelihood of the patient achieving goals; and potential problems that might occur during recuperation. I discussed reasonable alternatives to the procedure, including risks, benefits and side effects related to the alternatives and risks related to not receiving this procedure. We will proceed with procedure as planned. Patient returns today for second look ureteroscopy. Had first ureteroscopy back on 10/27/23 and stent placed earlier in the month. urine culture testing is negative for any urinary tract infection. She is tolerating her stent without pain. Has some urinary frequency. No dysuria. No hematuria. Still has ongoing high BP issues and is seeing Verena Thurman MD post op again and a month later too. Has OV with me post op for stent removal.  Here with spouse today  No new questions or concerns  No new changes to exam    Surgical risks, benefits and alternatives discussed. Risks of ureteroscopy including but not limited to infection, bleeding, perforation, possible need for stent, need for subsequent removal of stent, ureteral spasm, ureteral injury or stricture discussed with patient along with risks of anesthesia which could include death. Patient will proceed with cystoscopy, right ureteroscopy, stone extraction, possible stent replacement and possible laser lithotripsy today.

## 2023-11-20 NOTE — ANESTHESIA PROCEDURE NOTES
Airway  Date/Time: 11/20/2023 10:27 AM  Urgency: elective    Airway not difficult    General Information and Staff    Patient location during procedure: OR  Anesthesiologist: Pretty Chu MD  Performed: anesthesiologist   Performed by: Pretty Chu MD  Authorized by: Pretty Chu MD      Indications and Patient Condition  Indications for airway management: anesthesia  Spontaneous ventilation: present  Sedation level: deep  Preoxygenated: yes  Patient position: sniffing  Mask difficulty assessment: 1 - vent by mask    Final Airway Details  Final airway type: supraglottic airway      Successful airway: classic  Size 4       Number of attempts at approach: 1

## 2023-11-20 NOTE — ANESTHESIA POSTPROCEDURE EVALUATION
Patient: Shea Reza    Procedure Summary       Date: 11/20/23 Room / Location: [facility] OR 14 / [facility] OR    Anesthesia Start: 5281 Anesthesia Stop: 7539    Procedures:       Cystoscopy, right ureteroscopy, right ureteral stone extraction, right ureteral stent replacement, retrograde pyelogram (Right: Urethra)      CYSTOSCOPY STENT INSERTION (Right: Urethra)      CYSTOSCOPY RETROGRADE (Right: Urethra) Diagnosis: (Right hydronephrosis, right renal calculi)    Surgeons: Dianne Nassar MD Anesthesiologist: Lori Warner MD    Anesthesia Type: general ASA Status: 3            Anesthesia Type: general    Vitals Value Taken Time   /92 11/20/23 1127   Temp 97.2 11/20/23 1132   Pulse 92 11/20/23 1132   Resp 15 11/20/23 1132   SpO2 95 % 11/20/23 1132   Vitals shown include unfiled device data.     [facility] Post Evaluation:   Patient Evaluated in PACU  Patient Participation: complete - patient participated  Level of Consciousness: awake  Pain Score: 0  Pain Management: adequate  Airway Patency:patent  Dental exam unchanged from preop  Yes    Cardiovascular Status: acceptable  Respiratory Status: acceptable  Postoperative Hydration acceptable      Terence Carey MD  11/20/2023 11:32 AM

## 2024-05-30 ENCOUNTER — EKG ENCOUNTER (OUTPATIENT)
Dept: LAB | Facility: HOSPITAL | Age: 59
End: 2024-05-30
Payer: COMMERCIAL

## 2024-05-30 DIAGNOSIS — N13.2 URETERAL STONE WITH HYDRONEPHROSIS: ICD-10-CM

## 2024-05-30 PROCEDURE — 93010 ELECTROCARDIOGRAM REPORT: CPT | Performed by: INTERNAL MEDICINE

## 2024-05-30 PROCEDURE — 93005 ELECTROCARDIOGRAM TRACING: CPT

## 2024-05-30 RX ORDER — SCOLOPAMINE TRANSDERMAL SYSTEM 1 MG/1
1 PATCH, EXTENDED RELEASE TRANSDERMAL ONCE
Status: CANCELLED | OUTPATIENT
Start: 2024-05-30 | End: 2024-05-30

## 2024-05-31 LAB
ATRIAL RATE: 104 BPM
P AXIS: 66 DEGREES
P-R INTERVAL: 192 MS
Q-T INTERVAL: 356 MS
QRS DURATION: 88 MS
QTC CALCULATION (BEZET): 468 MS
R AXIS: 46 DEGREES
T AXIS: 70 DEGREES
VENTRICULAR RATE: 104 BPM

## 2024-06-02 ENCOUNTER — ANESTHESIA EVENT (OUTPATIENT)
Dept: SURGERY | Facility: HOSPITAL | Age: 59
End: 2024-06-02
Payer: COMMERCIAL

## 2024-06-03 ENCOUNTER — ANESTHESIA (OUTPATIENT)
Dept: SURGERY | Facility: HOSPITAL | Age: 59
End: 2024-06-03
Payer: COMMERCIAL

## 2024-06-03 ENCOUNTER — HOSPITAL ENCOUNTER (OUTPATIENT)
Facility: HOSPITAL | Age: 59
Setting detail: HOSPITAL OUTPATIENT SURGERY
Discharge: HOME | End: 2024-06-03
Attending: UROLOGY | Admitting: UROLOGY
Payer: COMMERCIAL

## 2024-06-03 ENCOUNTER — APPOINTMENT (OUTPATIENT)
Dept: GENERAL RADIOLOGY | Facility: HOSPITAL | Age: 59
End: 2024-06-03
Attending: UROLOGY
Payer: COMMERCIAL

## 2024-06-03 VITALS
OXYGEN SATURATION: 99 % | TEMPERATURE: 98 F | SYSTOLIC BLOOD PRESSURE: 144 MMHG | HEART RATE: 86 BPM | WEIGHT: 234.25 LBS | BODY MASS INDEX: 37.65 KG/M2 | HEIGHT: 66 IN | RESPIRATION RATE: 16 BRPM | DIASTOLIC BLOOD PRESSURE: 84 MMHG

## 2024-06-03 DIAGNOSIS — N13.2 URETERAL STONE WITH HYDRONEPHROSIS: Primary | ICD-10-CM

## 2024-06-03 LAB
GLUCOSE BLD-MCNC: 137 MG/DL (ref 70–99)
GLUCOSE BLD-MCNC: 161 MG/DL (ref 70–99)

## 2024-06-03 PROCEDURE — 0T768DZ DILATION OF RIGHT URETER WITH INTRALUMINAL DEVICE, VIA NATURAL OR ARTIFICIAL OPENING ENDOSCOPIC: ICD-10-PCS | Performed by: UROLOGY

## 2024-06-03 PROCEDURE — 82365 CALCULUS SPECTROSCOPY: CPT | Performed by: UROLOGY

## 2024-06-03 PROCEDURE — 82962 GLUCOSE BLOOD TEST: CPT

## 2024-06-03 PROCEDURE — BT1D1ZZ FLUOROSCOPY OF RIGHT KIDNEY, URETER AND BLADDER USING LOW OSMOLAR CONTRAST: ICD-10-PCS | Performed by: UROLOGY

## 2024-06-03 PROCEDURE — 0TC68ZZ EXTIRPATION OF MATTER FROM RIGHT URETER, VIA NATURAL OR ARTIFICIAL OPENING ENDOSCOPIC: ICD-10-PCS | Performed by: UROLOGY

## 2024-06-03 PROCEDURE — 88300 SURGICAL PATH GROSS: CPT | Performed by: UROLOGY

## 2024-06-03 DEVICE — URETERAL STENT
Type: IMPLANTABLE DEVICE | Status: FUNCTIONAL
Brand: ASCERTA™

## 2024-06-03 RX ORDER — METFORMIN HYDROCHLORIDE 750 MG/1
750 TABLET, EXTENDED RELEASE ORAL
COMMUNITY

## 2024-06-03 RX ORDER — DEXAMETHASONE SODIUM PHOSPHATE 4 MG/ML
VIAL (ML) INJECTION AS NEEDED
Status: DISCONTINUED | OUTPATIENT
Start: 2024-06-03 | End: 2024-06-03 | Stop reason: SURG

## 2024-06-03 RX ORDER — KETOROLAC TROMETHAMINE 30 MG/ML
INJECTION, SOLUTION INTRAMUSCULAR; INTRAVENOUS AS NEEDED
Status: DISCONTINUED | OUTPATIENT
Start: 2024-06-03 | End: 2024-06-03 | Stop reason: SURG

## 2024-06-03 RX ORDER — LABETALOL HYDROCHLORIDE 5 MG/ML
5 INJECTION, SOLUTION INTRAVENOUS EVERY 5 MIN PRN
Status: DISCONTINUED | OUTPATIENT
Start: 2024-06-03 | End: 2024-06-03

## 2024-06-03 RX ORDER — ONDANSETRON 2 MG/ML
INJECTION INTRAMUSCULAR; INTRAVENOUS AS NEEDED
Status: DISCONTINUED | OUTPATIENT
Start: 2024-06-03 | End: 2024-06-03 | Stop reason: SURG

## 2024-06-03 RX ORDER — DIPHENHYDRAMINE HYDROCHLORIDE 50 MG/ML
12.5 INJECTION INTRAMUSCULAR; INTRAVENOUS AS NEEDED
Status: DISCONTINUED | OUTPATIENT
Start: 2024-06-03 | End: 2024-06-03

## 2024-06-03 RX ORDER — ACETAMINOPHEN 500 MG
1000 TABLET ORAL ONCE
Status: DISCONTINUED | OUTPATIENT
Start: 2024-06-03 | End: 2024-06-03 | Stop reason: HOSPADM

## 2024-06-03 RX ORDER — HYDROCODONE BITARTRATE AND ACETAMINOPHEN 5; 325 MG/1; MG/1
1 TABLET ORAL ONCE AS NEEDED
Status: DISCONTINUED | OUTPATIENT
Start: 2024-06-03 | End: 2024-06-03

## 2024-06-03 RX ORDER — HYDROCODONE BITARTRATE AND ACETAMINOPHEN 5; 325 MG/1; MG/1
1 TABLET ORAL EVERY 6 HOURS PRN
Qty: 6 TABLET | Refills: 0 | Status: SHIPPED | OUTPATIENT
Start: 2024-06-03 | End: 2024-06-05

## 2024-06-03 RX ORDER — PROCHLORPERAZINE EDISYLATE 5 MG/ML
5 INJECTION INTRAMUSCULAR; INTRAVENOUS EVERY 8 HOURS PRN
Status: DISCONTINUED | OUTPATIENT
Start: 2024-06-03 | End: 2024-06-03

## 2024-06-03 RX ORDER — DEXTROSE MONOHYDRATE 25 G/50ML
50 INJECTION, SOLUTION INTRAVENOUS
Status: DISCONTINUED | OUTPATIENT
Start: 2024-06-03 | End: 2024-06-03 | Stop reason: HOSPADM

## 2024-06-03 RX ORDER — LIDOCAINE HYDROCHLORIDE 10 MG/ML
INJECTION, SOLUTION EPIDURAL; INFILTRATION; INTRACAUDAL; PERINEURAL AS NEEDED
Status: DISCONTINUED | OUTPATIENT
Start: 2024-06-03 | End: 2024-06-03 | Stop reason: SURG

## 2024-06-03 RX ORDER — PHENYLEPHRINE HCL 10 MG/ML
VIAL (ML) INJECTION AS NEEDED
Status: DISCONTINUED | OUTPATIENT
Start: 2024-06-03 | End: 2024-06-03 | Stop reason: SURG

## 2024-06-03 RX ORDER — HYDROMORPHONE HYDROCHLORIDE 1 MG/ML
0.2 INJECTION, SOLUTION INTRAMUSCULAR; INTRAVENOUS; SUBCUTANEOUS EVERY 5 MIN PRN
Status: DISCONTINUED | OUTPATIENT
Start: 2024-06-03 | End: 2024-06-03

## 2024-06-03 RX ORDER — ONDANSETRON 2 MG/ML
4 INJECTION INTRAMUSCULAR; INTRAVENOUS EVERY 6 HOURS PRN
Status: DISCONTINUED | OUTPATIENT
Start: 2024-06-03 | End: 2024-06-03

## 2024-06-03 RX ORDER — HYDROMORPHONE HYDROCHLORIDE 1 MG/ML
0.4 INJECTION, SOLUTION INTRAMUSCULAR; INTRAVENOUS; SUBCUTANEOUS EVERY 5 MIN PRN
Status: DISCONTINUED | OUTPATIENT
Start: 2024-06-03 | End: 2024-06-03

## 2024-06-03 RX ORDER — ACETAMINOPHEN 500 MG
1000 TABLET ORAL ONCE AS NEEDED
Status: DISCONTINUED | OUTPATIENT
Start: 2024-06-03 | End: 2024-06-03

## 2024-06-03 RX ORDER — MIDAZOLAM HYDROCHLORIDE 1 MG/ML
1 INJECTION INTRAMUSCULAR; INTRAVENOUS EVERY 5 MIN PRN
Status: DISCONTINUED | OUTPATIENT
Start: 2024-06-03 | End: 2024-06-03

## 2024-06-03 RX ORDER — PHENAZOPYRIDINE HYDROCHLORIDE 200 MG/1
200 TABLET, FILM COATED ORAL ONCE AS NEEDED
Status: DISCONTINUED | OUTPATIENT
Start: 2024-06-03 | End: 2024-06-03

## 2024-06-03 RX ORDER — NICOTINE POLACRILEX 4 MG
15 LOZENGE BUCCAL
Status: DISCONTINUED | OUTPATIENT
Start: 2024-06-03 | End: 2024-06-03 | Stop reason: HOSPADM

## 2024-06-03 RX ORDER — PHENAZOPYRIDINE HYDROCHLORIDE 200 MG/1
200 TABLET, FILM COATED ORAL 3 TIMES DAILY PRN
Qty: 15 TABLET | Refills: 0 | Status: SHIPPED | OUTPATIENT
Start: 2024-06-03 | End: 2024-06-08

## 2024-06-03 RX ORDER — MEPERIDINE HYDROCHLORIDE 25 MG/ML
12.5 INJECTION INTRAMUSCULAR; INTRAVENOUS; SUBCUTANEOUS AS NEEDED
Status: DISCONTINUED | OUTPATIENT
Start: 2024-06-03 | End: 2024-06-03

## 2024-06-03 RX ORDER — HYDROCODONE BITARTRATE AND ACETAMINOPHEN 5; 325 MG/1; MG/1
2 TABLET ORAL ONCE AS NEEDED
Status: DISCONTINUED | OUTPATIENT
Start: 2024-06-03 | End: 2024-06-03

## 2024-06-03 RX ORDER — NICOTINE POLACRILEX 4 MG
30 LOZENGE BUCCAL
Status: DISCONTINUED | OUTPATIENT
Start: 2024-06-03 | End: 2024-06-03 | Stop reason: HOSPADM

## 2024-06-03 RX ORDER — CLONIDINE HYDROCHLORIDE 0.3 MG/1
0.3 TABLET ORAL 2 TIMES DAILY
COMMUNITY

## 2024-06-03 RX ORDER — SODIUM CHLORIDE, SODIUM LACTATE, POTASSIUM CHLORIDE, CALCIUM CHLORIDE 600; 310; 30; 20 MG/100ML; MG/100ML; MG/100ML; MG/100ML
INJECTION, SOLUTION INTRAVENOUS CONTINUOUS
Status: DISCONTINUED | OUTPATIENT
Start: 2024-06-03 | End: 2024-06-03

## 2024-06-03 RX ORDER — NALOXONE HYDROCHLORIDE 0.4 MG/ML
0.08 INJECTION, SOLUTION INTRAMUSCULAR; INTRAVENOUS; SUBCUTANEOUS AS NEEDED
Status: DISCONTINUED | OUTPATIENT
Start: 2024-06-03 | End: 2024-06-03

## 2024-06-03 RX ORDER — LIDOCAINE HYDROCHLORIDE 20 MG/ML
JELLY TOPICAL AS NEEDED
Status: DISCONTINUED | OUTPATIENT
Start: 2024-06-03 | End: 2024-06-03 | Stop reason: HOSPADM

## 2024-06-03 RX ADMIN — SODIUM CHLORIDE, SODIUM LACTATE, POTASSIUM CHLORIDE, CALCIUM CHLORIDE: 600; 310; 30; 20 INJECTION, SOLUTION INTRAVENOUS at 08:25:00

## 2024-06-03 RX ADMIN — SODIUM CHLORIDE, SODIUM LACTATE, POTASSIUM CHLORIDE, CALCIUM CHLORIDE: 600; 310; 30; 20 INJECTION, SOLUTION INTRAVENOUS at 07:30:00

## 2024-06-03 RX ADMIN — LIDOCAINE HYDROCHLORIDE 50 MG: 10 INJECTION, SOLUTION EPIDURAL; INFILTRATION; INTRACAUDAL; PERINEURAL at 07:39:00

## 2024-06-03 RX ADMIN — PHENYLEPHRINE HCL 200 MCG: 10 MG/ML VIAL (ML) INJECTION at 07:48:00

## 2024-06-03 RX ADMIN — DEXAMETHASONE SODIUM PHOSPHATE 4 MG: 4 MG/ML VIAL (ML) INJECTION at 07:39:00

## 2024-06-03 RX ADMIN — ONDANSETRON 4 MG: 2 INJECTION INTRAMUSCULAR; INTRAVENOUS at 08:12:00

## 2024-06-03 RX ADMIN — KETOROLAC TROMETHAMINE 30 MG: 30 INJECTION, SOLUTION INTRAMUSCULAR; INTRAVENOUS at 08:12:00

## 2024-06-03 RX ADMIN — PHENYLEPHRINE HCL 100 MCG: 10 MG/ML VIAL (ML) INJECTION at 07:44:00

## 2024-06-03 NOTE — ANESTHESIA PREPROCEDURE EVALUATION
PRE-OP EVALUATION    Patient Name: Radha Crow    Admit Diagnosis: URETERAL STONE WITH HYDRONEPHROSIS    Pre-op Diagnosis: URETERAL STONE WITH HYDRONEPHROSIS    CYSTOSCOPY, RIGHT URETEROSCOPY HOLMIUM LASER LITHOTRIPSY, RIGHT URETERAL STONE EXTRACTION, RIGHT RETROGRADE PYELOGRAM, RIGHT URETERAL STENT PLACEMENT    Anesthesia Procedure: CYSTOSCOPY, RIGHT URETEROSCOPY HOLMIUM LASER LITHOTRIPSY, RIGHT URETERAL STONE EXTRACTION, RIGHT RETROGRADE PYELOGRAM, RIGHT URETERAL STENT PLACEMENT (Right)    Surgeons and Role:     * Ryan Lucio MD - Primary    Pre-op vitals reviewed.  Temp: 97.6 °F (36.4 °C)  Pulse: 123  Resp: 16  BP: 197/100  SpO2: 100 %  Body mass index is 37.81 kg/m².    Current medications reviewed.  Hospital Medications:   acetaminophen (Tylenol Extra Strength) tab 1,000 mg  1,000 mg Oral Once    glucose (Dex4) 15 GM/59ML oral liquid 15 g  15 g Oral Q15 Min PRN    Or    glucose (Glutose) 40% oral gel 15 g  15 g Oral Q15 Min PRN    Or    glucose-vitamin C (Dex-4) chewable tab 4 tablet  4 tablet Oral Q15 Min PRN    Or    dextrose 50% injection 50 mL  50 mL Intravenous Q15 Min PRN    Or    glucose (Dex4) 15 GM/59ML oral liquid 30 g  30 g Oral Q15 Min PRN    Or    glucose (Glutose) 40% oral gel 30 g  30 g Oral Q15 Min PRN    Or    glucose-vitamin C (Dex-4) chewable tab 8 tablet  8 tablet Oral Q15 Min PRN    lactated ringers infusion   Intravenous Continuous    ceFAZolin (Ancef) 2g in 10mL IV syringe premix  2 g Intravenous Once    ceFAZolin (Ancef) 2 g/10mL IV syringe premix           Outpatient Medications:     Medications Prior to Admission   Medication Sig Dispense Refill Last Dose    hydrALAZINE 100 MG Oral Tab Take 1 tablet (100 mg total) by mouth 2 (two) times daily.   6/3/2024 at 0300    labetalol 100 MG Oral Tab Take 1 tablet (100 mg total) by mouth as needed (if blood pressure over 160).   5/27/2024    triamterene-hydroCHLOROthiazide 37.5-25 MG Oral Cap Take 1 capsule by mouth every morning.   6/3/2024  at 0300    Multiple Vitamin (MULTIVITAMIN ADULT OR) Take by mouth daily.   5/27/2024    ELDERBERRY OR Take by mouth daily.   5/27/2024    metFORMIN  MG Oral Tablet 24 Hr TAKE 1 TABLET BY MOUTH ONCE DAILY WITH SUPPER 90 tablet 0 6/2/2024 at 0800    cloNIDine 0.1 MG Oral Tab Take 1 tablet (0.1 mg total) by mouth daily. 90 tablet 3 6/3/2024 at 0300    Glucose Blood (ACCU-CHEK MARTIN) In Vitro Strip SMBG BID- strip 6     ibuprofen 600 MG Oral Tab Take 1 tablet (600 mg total) by mouth every 6 (six) hours as needed for Pain.   More than a month    Accu-Chek Multiclix Lancets Does not apply Misc SMBG BID- each 6     EASY TOUCH LANCETS Does not apply Misc As directed for SMBG 100 each 4     Blood Glucose Monitoring Suppl (EASY TOUCH GLUCOSE SYSTEM) W/DEVICE Does not apply Kit As directed for SMBG 1 kit 0     Blood Glucose Calibration (ACCU-CHEK MARTIN) In Vitro Solution Test with each new set of strips 1 Each 3        Allergies: Patient has no known allergies.      Anesthesia Evaluation    Patient summary reviewed.    Anesthetic Complications  (-) history of anesthetic complications         GI/Hepatic/Renal                                 Cardiovascular    Negative cardiovascular ROS.  ECG reviewed.  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     History of prior thyroid ablation treatment     Diabetes mellitus type 2, controlled (HCC)     Seasonal allergic rhinitis     Vitamin D deficiency     Hyperlipidemia LDL goal <100     Osteoarthritis of both hands     Kidney stone     Lung nodule     Solitary pulmonary  nodule on lung CT     Cervical radiculitis     Neck pain     Achilles tendinitis, right leg     Hypertensive urgency     Type 2 diabetes mellitus with hyperglycemia, without long-term current use of insulin (HCC)     Graves disease     COVID-19 virus infection     Ureteral stone with hydronephrosis          Past Surgical History:   Procedure Laterality Date    Cholecystectomy      Colonoscopy  01/01/2013    hemorrhoids; repeat 10 yrs    Cysto/uretero, stone remove Right 11/20/2023    repeat URS, stone extraction, stent replacement    Cystoscopy,fragmt ureteral stone Right 10/27/2023    laser litho large distal ureter stone    Cystoscopy,insert ureteral stent Right 10/04/2023    Removal gallbladder      Thyroid ablation  12/01/2008    I-131 radioablation of thyroid    Tubal ligation       Social History     Socioeconomic History    Marital status:     Number of children: 7   Occupational History    Occupation: customer service   Tobacco Use    Smoking status: Never    Smokeless tobacco: Never   Vaping Use    Vaping status: Never Used   Substance and Sexual Activity    Alcohol use: No     Alcohol/week: 0.0 standard drinks of alcohol    Drug use: No    Sexual activity: Yes     Partners: Male     Birth control/protection: Tubal Ligation     History   Drug Use No     Available pre-op labs reviewed.               Airway      Mallampati: II  Mouth opening: >3 FB  TM distance: > 6 cm  Neck ROM: full Cardiovascular      Rhythm: regular  Rate: normal  (-) murmur   Dental  Comment: Dentition is grossly intact;  Patient does not demonstrate loose teeth to inspection.           Pulmonary  Comment: Unlabored ventilatory effort, no retractions.  Pulmonary exam normal.  Breath sounds clear to auscultation bilaterally.               Other findings              ASA: 2   Plan: general  NPO status verified and patient meets guidelines.        Comment: I explained intrinsic risks of general anesthesia, including nausea, dental  damage, sore throat, mouth injury,and hoarseness from airway management.  All questions were answered and understanding was demonstrated of risks.  Informed permission was obtained to proceed as documented in the signed consent form.     Plan/risks discussed with: patient and spouse                Present on Admission:  **None**

## 2024-06-03 NOTE — OPERATIVE REPORT
[facility]   part of [facility]    Operative Note         Radha Crow Location: OR   [facility] [id number] MRN [id number]   Admission Date 6/3/2024 Operation Date 6/3/2024   Attending Physician Ryan Lucio MD       Patient Name: Radha Crow     Preoperative Diagnosis: URETERAL STONE WITH HYDRONEPHROSIS     Postoperative Diagnosis: URETERAL STONE WITH HYDRONEPHROSIS     Procedure(s):  CYSTOSCOPY, RIGHT URETEROSCOPY, RIGHT URETERAL STONE EXTRACTION, RIGHT RETROGRADE PYELOGRAM, RIGHT URETERAL STENT PLACEMENT     Primary Surgeon: Ryan Lucio MD           Anesthesia: General     Specimen:   ID Type Source Tests Collected by Time Destination   1 : RIGHT URETERAL STONES Calculus Stone (calculus) CALCULI, URINARY, SURGICAL PATHOLOGY TISSUE Ryan Lucio MD 6/3/2024 0817         Estimated Blood Loss: 0ml     Complications: none       Indications for procedure: persistent right hydronephrosis with distal right ureteral stones that have not passed     Surgical Findings: multiple right ureter stones, some ureter stricture, noted hydroureteronephrosis proximal with >100ml urine aspirated from the right kidney unit     Complexity: NA (optional)    Operative Summary:       DRAINS: 6Fr x 24cm double J ureter stent     ESTIMATED BLOOD LOSS: Less than 5 mL.     INDICATION FOR PROCEDURE: Radha is a 58 year old year old year-old female who was diagnosed with right ureter stones that have not passed. Desires surgical intervention.  Understands the risks of the procedure, including but not limited to the general anesthetic involved, urinary bleeding, infection, bladder injury, ureteral injury, renal injury, inability to extract the large stone due to its large nature, the need for laser lithotripsy with subsequent passage of particles, the possible need for ureteral stent with subsequent removal, the risks for ureteral stricture disease, stone persistence, re-formation, and the unilateral nature of this procedure. Would like to  proceed. Right side marked     PROCEDURE IN DETAIL: The patient was brought to the operating room after being correctly identified and surgical consent was obtained. All questions were answered. General anesthesia was induced without difficulty. Legs were placed in the dorsal lithotomy position, padded and secured, and the working area was prepped and draped in the usual sterile fashion. Examination by  fluoroscopy could not clearly identify the stone. A 22-[place]er scope was inserted per urethra. The urethra was normal. Upon entry into the bladder, the urine was grossly clear. Panendoscopy was performed and showed no evidence of any bladder tumors, diverticula, or stones. The ureteral orifices were in normal position. The right side was cannulated with the open-ended catheter and a retrograde pyelogram was performed showing a normal distal caliber ureter and a stone blockage 6-8cm from the UVJ with notable hydroureter above that. I was able to pass the wire up into the kidney, and then the Anthony dilator. A semi rigid ureteroscope was then passed along side the wire. The stones were identified in this region of the distal ureter.  There is some stricture there from the stone impaction.  With scope passed proximal to the stones, urine was aspirated out of the right kidney unit, grossly clear. I was able to use the stone basket to retrieve out the stones one by one. There was residual edema and stricture at this site from the stones. On retrograde pyelogram, I saw slowing of flow down and through this region. Still notable chronically dilated ureter above it.  The wire was positioned, a 6fr x 24cm double J ureter stent was deployed with the proximal pigtail in the kidney and the distal pigtail in the bladder. No string. Lidocaine jelly was instilled per urethra. The patient was returned to the supine position, extubated, and transferred to the recovery room in stable condition. I was present and performed the  entire procedure as stated above. There were no complications.  The stent will be removed in a couple of weeks in the office.      Implants:   Implant Name Type Inv. Item Serial No.  Lot No. LRB No. Used Action   STENT URET [id number] ASCERTA - SN/A  STENT URET [id number] ASCERTA N/A [employer] WD [id number] Implanted        Drains: no string     Condition: stable       Ryan Lucio MD

## 2024-06-03 NOTE — H&P
History & Physical Examination    Name: Radha Crow  Patient ID:  [id number]  Date:  6/3/2024  YOB: 1965 AGE:  58 year old SEX:  female      M.D./D.O./D.P.M PERFORMING SURGERY/PROCEDURE:    Ryan Lucio MD    Diagnosis:  URETERAL STONE WITH HYDRONEPHROSIS    HPI  Right sided stone found on CT. Minimal RLQ pain. No LUTS/dysuria/hematuria. Stone has not passed. Desires surgery.  Had larger stone burden laser year requiring staged URS.    Procedure: CYSTOSCOPY, RIGHT URETEROSCOPY HOLMIUM LASER LITHOTRIPSY, RIGHT URETERAL STONE EXTRACTION, RIGHT RETROGRADE PYELOGRAM, RIGHT URETERAL STENT PLACEMENT    Anesthesia Type: General    Allergies: No Known Allergies    Medications:  No current outpatient medications on file.    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    She has no past medical history of Anesthesia complication, Deep vein thrombosis (HCC), Difficult intubation, Family history of malignant hyperthermia, Family history of pseudocholinesterase deficiency, History of adverse reaction to anesthesia, History of blood transfusion, motion sickness, Malignant hyperthermia, PONV (postoperative nausea and vomiting), Prediabetes, Pseudocholinesterase deficiency, Pulmonary embolism (HCC), Sleep apnea, Stroke (HCC), or Type 1 diabetes mellitus ([facility]).    Surgical History:  has a past surgical history that includes thyroid ablation (12/01/2008); cholecystectomy; tubal ligation; removal gallbladder; colonoscopy (01/01/2013); cystoscopy,insert ureteral stent (Right,  10/04/2023); cystoscopy,fragmt ureteral stone (Right, 10/27/2023); and cysto/uretero, stone remove (Right, 11/20/2023).    Family History: family history includes Breast Cancer (age of onset: 28) in her cousin; Breast Cancer (age of onset: 30) in her cousin; Breast Cancer (age of onset: 40) in her maternal aunt; Breast Cancer (age of onset: 50) in her cousin; Breast Cancer (age of onset: 54) in her cousin; Breast Cancer (age of onset: 60) in her maternal aunt; Dementia in her mother and paternal uncle; Diabetes in her maternal grandmother; Glaucoma in her father and father; Heart Disease in her brother and father; Hypertension in her mother and sister; Kidney Disease in her father, maternal grandmother, mother, sister, sister, and sister; Neurological Disorder in her mother; No Known Problems in her daughter, daughter, daughter, daughter, son, son, and son; Pacemaker in her father.    Physical Exam:     System Review is Normal Exam is Normal If not normal, explain   HEENT yes yes    NECK & BACK yes yes    HEART yes yes    LUNGS yes yes    ABDOMEN yes Non tender abd    UROGENITAL yes yes    EXTREMITIES yes yes    MENTAL STATUS yes yes        Impression: right ureter stone with hydronephrosis    Plan:    Surgical risks, benefits and alternatives discussed.  Risks of ureteroscopy including but not limited to infection, bleeding, perforation, possible need for stent, need for subsequent removal of stent, ureteral spasm, ureteral injury or stricture discussed with patient along with risks of anesthesia which could include death.  Right side confirmed and marked      Patient will proceed with cystoscopy, right ureteroscopy, stone extraction, possible stent placement and possible laser lithotripsy.    I have discussed the risks and benefits and alternatives with the patient/family. They understand and agree to proceed with the plan of care.  Yes    Spouse present in pre op holding  Reviewed poss string stent and self  removal.  She thinks she would still prefer nursing visit.    Interval Note:    I have reviewed the History and Physical done within the last 30 days.  Any changes are noted above.     Ryan Lucio MD 06/03/24

## 2024-06-03 NOTE — DISCHARGE INSTRUCTIONS
Your stones are being analyzed.  There is scar where the stones have been stuck.  The stent will help this area heal.  There is no string.  Please keep your appt with Dr. Lucio for removal.    Ureteral Stents    A ureteral stent is a soft plastic tube with holes in it. It’s temporarily inserted into a ureter to help drain urine into the bladder. One end goes in the kidney. The other end goes in the bladder. A coil on each end holds the stent in place. The stent can’t be seen from outside the body. It shouldn’t interfere with your normal routine. Your stent will be put in by a doctor trained in treating the urinary tract (a urologist) or another specialist. The procedure is done in a hospital or surgery center. You’ll likely go home the same day.  When is a ureteral stent used?  A ureteral stent may be used:  To bypass a blockage in a kidney or ureter.  During kidney stone removal.  To let a ureter heal after surgery.  Before the Procedure  Your healthcare provider will give you instructions to prepare for the procedure. X-rays or other imaging tests of your kidneys and ureters may be done beforehand.  During the procedure  You receive medicine to prevent pain and help you relax or sleep during the procedure. Once this takes effect, the procedure starts.  The doctor inserts a cystoscope (lighted instrument) through the urethra and into the bladder. This shows the opening to the ureter.  A thin wire is carefully threaded through the cystoscope, up the ureter, and into the kidney. The stent is inserted over the wire.  A fluoroscope (special X-ray machine) is used to help position the stent. When the stent is in place, the wire and cystoscope are removed.  While you have a stent  Some discomfort is normal. Certain movements may trigger pain or a feeling that you need to urinate. You may also feel mild soreness or pressure before or during urination. These symptoms will go away a few days after the stent is  removed.  Medicine to control pain or bladder spasms or to prevent infection may be prescribed. Take this as directed.  Drink plenty of fluids to help flush out your urinary tract.  Your urine may be slightly pink or red. This is due to bleeding caused by minor irritation from the stent. This may happen on and off while you have the stent.  As with any synthetic device placed in the body, there is a risk of infection. The stent may have to be removed if this happens.   How long will you need a stent?  The stent is often taken out after the blockage in the ureter is treated or the ureter has healed. This may take 1 week to 2 weeks, or longer. If a stent is needed for a long time, it may need to be changed every few months.  When to call your healthcare provider  Contact your healthcare provider right away if:  Your urine contains blood clots or you see a large amount of blood-tinged urine  You have symptoms similar to those you had before the stent was placed  You constantly leak urine  You have a fever over 100.4°F (38°C), chills, nausea, or vomiting  Your pain is not relieved with medicine  The end of the stent comes out of the urethra  Date Last Reviewed: 1/1/2017  © 1959-0704 [employer]. [address] [place] [address]. All rights reserved. This information is not intended as a substitute for professional medical care. Always follow your healthcare professional's instructions.    You received a drug called Toradol which is an Anti Inflammatory at: 8:15 am  If you are allowed to take Anti inflammatories:    Do not take any Anti Inflammatory like Motrin, Aleve or Ibuprophen until after: 2:15 pm  Please report any suspected allergic reactions or bleeding issues to your doctor

## 2024-06-03 NOTE — ANESTHESIA POSTPROCEDURE EVALUATION
[facility]    Radha Crow Patient Status:  Outpatient in a Bed   Age/Gender 58 year old female MRN [id number]   Location [facility] SURGERY Attending Ryan Lucio MD   Hosp Day # 0 PCP Karen Ramirez MD       Anesthesia Post-op Note    CYSTOSCOPY, RIGHT URETEROSCOPY, RIGHT URETERAL STONE EXTRACTION, RIGHT RETROGRADE PYELOGRAM, RIGHT URETERAL STENT PLACEMENT    Procedure Summary       Date: 06/03/24 Room / Location:  MAIN OR 07 /  MAIN OR    Anesthesia Start: 0728 Anesthesia Stop: 0826    Procedure: CYSTOSCOPY, RIGHT URETEROSCOPY, RIGHT URETERAL STONE EXTRACTION, RIGHT RETROGRADE PYELOGRAM, RIGHT URETERAL STENT PLACEMENT (Right: Ureter) Diagnosis: (URETERAL STONE WITH HYDRONEPHROSIS)    Surgeons: Ryan Lucio MD Anesthesiologist: Winston Mike MD    Anesthesia Type: general ASA Status: 2            Anesthesia Type: general    Vitals Value Taken Time   /80 06/03/24 0830   Temp 97.9 06/03/24 0830   Pulse 104 06/03/24 0830   Resp 20 06/03/24 0830   SpO2 97% 06/03/24 0830       Patient Location: PACU    Anesthesia Type: general    Airway Patency: patent    Postop Pain Control: adequate    Mental Status: mildly sedated but able to meaningfully participate in the post-anesthesia evaluation    Nausea/Vomiting: none    Cardiopulmonary/Hydration status: stable euvolemic    Complications: no apparent anesthesia related complications    Postop vital signs: stable    Dental Exam: Unchanged from Preop    Patient to be discharged from PACU when criteria met.

## 2024-06-03 NOTE — ANESTHESIA PROCEDURE NOTES
Airway  Date/Time: 6/3/2024 7:35 AM  Urgency: elective      General Information and Staff    Patient location during procedure: OR  Anesthesiologist: Winston Mike MD  Resident/CRNA: Audie Escobar CRNA  Performed: CRNA   Performed by: Audie Escobar CRNA  Authorized by: Winston Mike MD      Indications and Patient Condition  Indications for airway management: anesthesia  Sedation level: deep  Preoxygenated: yes  Patient position: sniffing  Mask difficulty assessment: 1 - vent by mask    Final Airway Details  Final airway type: supraglottic airway      Successful airway: classic  Size 4       Number of attempts at approach: 1

## 2024-06-11 LAB
CAOX DIHYDRATE: 20 %
CAOX MONOHYDRATE: 80 %
WEIGHT-STONE: 75 MG

## (undated) DEVICE — ENDOSCOPIC VALVE WITH ADAPTER.: Brand: SURSEAL® II

## (undated) DEVICE — SKN PREP SPNG STKS PVP PNT STR: Brand: MEDLINE INDUSTRIES, INC.

## (undated) DEVICE — SOLUTION IRRIG 3000ML 0.9% NACL FLX CONT

## (undated) DEVICE — STERILE SURGICAL LUBRICANT, METAL TUBE: Brand: SURGILUBE

## (undated) DEVICE — CYSTO CDS-LF: Brand: MEDLINE INDUSTRIES, INC.

## (undated) DEVICE — TIGERTAIL 5F FLXTIP 70CM

## (undated) DEVICE — SOLUTION IV 1000ML DIL ST H2O

## (undated) DEVICE — SOLUTION IRRIG 1000ML ST H2O AQUALITE PLAS

## (undated) DEVICE — MAT TRANSFER HALFMATS 39 X 49

## (undated) DEVICE — GLOVE SUR 6.5 SENSICARE PI PIP CRM PWD F

## (undated) DEVICE — SNAP KOVER: Brand: UNBRANDED

## (undated) DEVICE — STERILE H2O FOR IRRIG .

## (undated) DEVICE — GAMMEX® PI HYBRID SIZE 6.5, STERILE POWDER-FREE SURGICAL GLOVE, POLYISOPRENE AND NEOPRENE BLEND: Brand: GAMMEX

## (undated) DEVICE — ZIPWIRE GUIDEWIRE .038X150 STR

## (undated) DEVICE — CATHETER URET 5FR L70CM FLX OPN TIP NONPORTED

## (undated) DEVICE — [URETERAL KIT: 2 - URETERAL CATHETERS, 6 FR OUTER DIAMETER, 70 CM LENGTH,  2 - EMITTING FIBER, 0.75 OUTER DIAMETER, 370 CM LENGTH,  DO NOT USE IF PACKAGE IS DAMAGED]: Brand: IRIS

## (undated) DEVICE — 3M™ TEGADERM™ TRANSPARENT FILM DRESSING FRAME STYLE, 1624W, 2-3/8 IN X 2-3/4 IN (6 CM X 7 CM), 100/CT 4CT/CASE: Brand: 3M™ TEGADERM™

## (undated) DEVICE — UROLOGY DRAIN BAG

## (undated) DEVICE — STERILE POLYISOPRENE POWDER-FREE SURGICAL GLOVES: Brand: PROTEXIS

## (undated) DEVICE — SLEEVE COMPR MD KNEE LEN SGL USE KENDALL SCD

## (undated) DEVICE — GUIDEWIRE ENDOSCP L150CM DIA0.035IN TIP 3CM

## (undated) DEVICE — PACK PBDS CYSTOSCOPY

## (undated) DEVICE — SLEEVE COMPR M KNEE LEN SGL USE KENDALL SCD

## (undated) DEVICE — CATHETER URET L70CM 4FR POLYUR CONE TIP DISP

## (undated) DEVICE — SERVICE RENTAL LSR TECH ONLY

## (undated) DEVICE — ZIPWIRE GUIDEWIRE .035X150 STR

## (undated) DEVICE — CYSTO PACK: Brand: MEDLINE INDUSTRIES, INC.

## (undated) DEVICE — NITINOL STONE RETRIEVAL BASKET: Brand: ZERO TIP

## (undated) DEVICE — CONRAY 60% VI

## (undated) DEVICE — HYDROGEL COATED URETERAL DILATOR: Brand: NOTTINGHAM ONE-STEP

## (undated) DEVICE — MEDI-VAC NON-CONDUCTIVE SUCTION TUBING: Brand: CARDINAL HEALTH

## (undated) NOTE — LETTER
Patient Name: Uday Banks  Surgery Date: 10/27/2023  Medical Record: [id number]  CSN: [id number]    Surgeon(s):  Contreras Pendleton MD  Consent Procedure: CYSTOSCOPY, RIGHT URETEROSCOPY WITH HOLMIUM LASER LITHOTRIPSY, RIGHT URETERAL STONE EXTRACTION, RIGHT RETROGRADE PYELOGRAM, RIGHT URETERAL STENT RE-PLACEMENT  Anesthesia Type: General    To Attending: Contreras Pendleton MD  To PCP:  Dr. Julius Whiting  To Other:     Pamla Footman Dr. Jennett Cranker REQUESTED THE FOLLOWING:  NOTE OF MEDICAL CLEARANCE  _____________________________________________________  Please note the following attached testing results for your review:   None

## (undated) NOTE — LETTER
Date & Time: 9/27/2023, 8:42 PM  Patient: Lisa Manzano  Encounter Provider(s):    Candie Mcmillan MD       To Whom It May Concern:    Lisa Manzano was seen and treated in our department on 9/27/2023. She can return to work on 10/02/2023.     If you have any questions or concerns, please do not hesitate to call.        _____________________________  Physician/APC Signature

## (undated) NOTE — LETTER
OSR/MICHELLE Notification    Patient Name: Johnson Martinez / Sex: 9/29/1965-A: 60 y  female  Medical Records: [id number] CSN: [id number]    Surgeon(s):  Surgeon(s):  Juan Francisco Lucio MD   Procedure: CYSTOSCOPY, RIGHT URETEROSCOPY WITH HOLMIUM LASER LITHOTRIPSY, RIGHT URETERAL STONE EXTRACTION, RIGHT RETROGRADE PYELOGRAM, RIGHT URETERAL STENT RE-PLACEMENT    Anesthesia Type: General Surgery Date: 10/27/23    During the pre-operative screening process using the [employer] questionnaire, the patient listed above was identified as being at high risk for obstructive sleep apnea. If you feel it is appropriate to schedule a sleep study, the [facility] will give priority to patients scheduled for procedures to minimize surgical delays. If a sleep study is completed prior to surgery, please fax the results/plan of care to Beatriz Braun ([phone]) as this information will be utilized in clinical decision-making regarding the patient's upcoming surgery. Thank you for your assistance in helping us provide a safe, seamless and personal experience for your patient.     MD Guadalupe Giang, Department of Anesthesia

## (undated) NOTE — ED AVS SNAPSHOT
[facility] Emergency Department    [place]  [address] [address]    Phone:  [phone]    Fax:  [phone]           Mary Cunha   MRN: [id number]    Department:  [facility] Emergency Department   Date of Visit:  3/23/ IF THERE IS ANY CHANGE OR WORSENING OF YOUR CONDITION, CALL YOUR PRIMARY CARE PHYSICIAN AT ONCE OR RETURN IMMEDIATELY TO THE EMERGENCY DEPARTMENT.     If you have been prescribed any medication(s), please fill your prescription right away and begin taking t

## (undated) NOTE — ED AVS SNAPSHOT
[facility] Emergency Department    [place]  [address] [address]    Phone:  [phone]    Fax:  [phone]           Ubaldo Mcburney   MRN: [id number]    Department:  [facility] Emergency Department   Date of Visit:  3/23/ Where to Get Your Medications      You can get these medications from any pharmacy     Bring a paper prescription for each of these medications    - predniSONE 20 MG Tabs            Discharge References/Attachments     PAIN, ACUTE, UNCERTAIN CAUSE (ENGLISH IF THERE IS ANY CHANGE OR WORSENING OF YOUR CONDITION, CALL YOUR PRIMARY CARE PHYSICIAN AT ONCE OR RETURN IMMEDIATELY TO THE EMERGENCY DEPARTMENT.     If you have been prescribed any medication(s), please fill your prescription right away and begin taking t [address] to help you get signed up for insurance coverage. [address] is a Federal Navigator program that can help with your Affordable Care Act coverage, as well as all types of Medicaid plans.   To get signed up and covere